# Patient Record
Sex: FEMALE | Race: WHITE | HISPANIC OR LATINO | URBAN - METROPOLITAN AREA
[De-identification: names, ages, dates, MRNs, and addresses within clinical notes are randomized per-mention and may not be internally consistent; named-entity substitution may affect disease eponyms.]

---

## 2018-03-27 ENCOUNTER — OUTPATIENT (OUTPATIENT)
Dept: OUTPATIENT SERVICES | Facility: HOSPITAL | Age: 53
LOS: 1 days | Discharge: HOME | End: 2018-03-27

## 2018-03-27 DIAGNOSIS — Z12.31 ENCOUNTER FOR SCREENING MAMMOGRAM FOR MALIGNANT NEOPLASM OF BREAST: ICD-10-CM

## 2018-03-28 DIAGNOSIS — Z78.0 ASYMPTOMATIC MENOPAUSAL STATE: ICD-10-CM

## 2018-03-28 DIAGNOSIS — M89.9 DISORDER OF BONE, UNSPECIFIED: ICD-10-CM

## 2018-03-28 DIAGNOSIS — Z13.820 ENCOUNTER FOR SCREENING FOR OSTEOPOROSIS: ICD-10-CM

## 2019-12-27 ENCOUNTER — OUTPATIENT (OUTPATIENT)
Dept: OUTPATIENT SERVICES | Facility: HOSPITAL | Age: 54
LOS: 1 days | Discharge: HOME | End: 2019-12-27
Payer: COMMERCIAL

## 2019-12-27 DIAGNOSIS — Z12.31 ENCOUNTER FOR SCREENING MAMMOGRAM FOR MALIGNANT NEOPLASM OF BREAST: ICD-10-CM

## 2019-12-27 PROCEDURE — 77067 SCR MAMMO BI INCL CAD: CPT | Mod: 26

## 2019-12-27 PROCEDURE — 77063 BREAST TOMOSYNTHESIS BI: CPT | Mod: 26

## 2021-05-07 ENCOUNTER — OUTPATIENT (OUTPATIENT)
Dept: OUTPATIENT SERVICES | Facility: HOSPITAL | Age: 56
LOS: 1 days | Discharge: HOME | End: 2021-05-07
Payer: COMMERCIAL

## 2021-05-07 DIAGNOSIS — Z12.31 ENCOUNTER FOR SCREENING MAMMOGRAM FOR MALIGNANT NEOPLASM OF BREAST: ICD-10-CM

## 2021-05-07 DIAGNOSIS — R92.2 INCONCLUSIVE MAMMOGRAM: ICD-10-CM

## 2021-05-07 PROCEDURE — 76641 ULTRASOUND BREAST COMPLETE: CPT | Mod: 26,50

## 2021-05-07 PROCEDURE — 77067 SCR MAMMO BI INCL CAD: CPT | Mod: 26

## 2021-05-07 PROCEDURE — 77063 BREAST TOMOSYNTHESIS BI: CPT | Mod: 26

## 2021-07-08 VITALS — WEIGHT: 169 LBS | DIASTOLIC BLOOD PRESSURE: 84 MMHG | SYSTOLIC BLOOD PRESSURE: 136 MMHG

## 2022-08-05 PROBLEM — Z00.00 ENCOUNTER FOR PREVENTIVE HEALTH EXAMINATION: Status: ACTIVE | Noted: 2022-08-05

## 2022-08-18 ENCOUNTER — RESULT REVIEW (OUTPATIENT)
Age: 57
End: 2022-08-18

## 2022-08-18 ENCOUNTER — OUTPATIENT (OUTPATIENT)
Dept: OUTPATIENT SERVICES | Facility: HOSPITAL | Age: 57
LOS: 1 days | Discharge: HOME | End: 2022-08-18

## 2022-08-18 ENCOUNTER — APPOINTMENT (OUTPATIENT)
Dept: OBGYN | Facility: CLINIC | Age: 57
End: 2022-08-18

## 2022-08-18 DIAGNOSIS — Z12.31 ENCOUNTER FOR SCREENING MAMMOGRAM FOR MALIGNANT NEOPLASM OF BREAST: ICD-10-CM

## 2022-08-18 PROCEDURE — 77063 BREAST TOMOSYNTHESIS BI: CPT | Mod: 26

## 2022-08-18 PROCEDURE — 77067 SCR MAMMO BI INCL CAD: CPT | Mod: 26

## 2022-09-09 DIAGNOSIS — Z92.89 PERSONAL HISTORY OF OTHER MEDICAL TREATMENT: ICD-10-CM

## 2022-09-09 DIAGNOSIS — Z78.9 OTHER SPECIFIED HEALTH STATUS: ICD-10-CM

## 2022-09-09 DIAGNOSIS — Z82.49 FAMILY HISTORY OF ISCHEMIC HEART DISEASE AND OTHER DISEASES OF THE CIRCULATORY SYSTEM: ICD-10-CM

## 2022-09-09 DIAGNOSIS — Z87.19 PERSONAL HISTORY OF OTHER DISEASES OF THE DIGESTIVE SYSTEM: ICD-10-CM

## 2022-09-09 DIAGNOSIS — Z78.0 ASYMPTOMATIC MENOPAUSAL STATE: ICD-10-CM

## 2022-09-09 DIAGNOSIS — Z87.09 PERSONAL HISTORY OF OTHER DISEASES OF THE RESPIRATORY SYSTEM: ICD-10-CM

## 2022-09-15 ENCOUNTER — APPOINTMENT (OUTPATIENT)
Dept: OBGYN | Facility: CLINIC | Age: 57
End: 2022-09-15

## 2022-09-15 VITALS
SYSTOLIC BLOOD PRESSURE: 140 MMHG | WEIGHT: 160 LBS | BODY MASS INDEX: 29.44 KG/M2 | HEART RATE: 86 BPM | HEIGHT: 62 IN | DIASTOLIC BLOOD PRESSURE: 70 MMHG

## 2022-09-15 DIAGNOSIS — Z12.31 ENCOUNTER FOR SCREENING MAMMOGRAM FOR MALIGNANT NEOPLASM OF BREAST: ICD-10-CM

## 2022-09-15 PROCEDURE — 81003 URINALYSIS AUTO W/O SCOPE: CPT | Mod: QW

## 2022-09-15 PROCEDURE — 99396 PREV VISIT EST AGE 40-64: CPT

## 2022-09-15 NOTE — PHYSICAL EXAM
[Examination Of The Breasts] : a normal appearance [No Masses] : no breast masses were palpable [Labia Majora] : normal [Labia Minora] : normal [Normal] : normal [Absent] : absent [Uterine Adnexae] : normal [FreeTextEntry4] : vaginal cuff well supported

## 2022-09-20 LAB
BILIRUB UR QL STRIP: NORMAL
CLARITY UR: CLEAR
COLLECTION METHOD: NORMAL
GLUCOSE UR-MCNC: NORMAL
HCG UR QL: 0.2 EU/DL
HGB UR QL STRIP.AUTO: NORMAL
KETONES UR-MCNC: NORMAL
LEUKOCYTE ESTERASE UR QL STRIP: NORMAL
NITRITE UR QL STRIP: NORMAL
PH UR STRIP: 6
PROT UR STRIP-MCNC: NORMAL
SP GR UR STRIP: 1.03

## 2023-01-17 ENCOUNTER — APPOINTMENT (OUTPATIENT)
Dept: ORTHOPEDIC SURGERY | Facility: CLINIC | Age: 58
End: 2023-01-17
Payer: COMMERCIAL

## 2023-01-17 VITALS — BODY MASS INDEX: 26.68 KG/M2 | HEIGHT: 62 IN | WEIGHT: 145 LBS

## 2023-01-17 DIAGNOSIS — R22.31 LOCALIZED SWELLING, MASS AND LUMP, RIGHT UPPER LIMB: ICD-10-CM

## 2023-01-17 PROCEDURE — 99213 OFFICE O/P EST LOW 20 MIN: CPT

## 2023-01-17 PROCEDURE — 73130 X-RAY EXAM OF HAND: CPT | Mod: RT

## 2023-01-17 PROCEDURE — 99203 OFFICE O/P NEW LOW 30 MIN: CPT

## 2023-01-17 NOTE — HISTORY OF PRESENT ILLNESS
[de-identified] : 58-year-old female is here today for evaluation of her right hand.  Patient states she started having pain and swelling in her right hand over the weekend.  He does not recall any specific injury or trauma.  She states she was playing with her 2-year-old granddaughter but other than that does not recall any cause or injury.  She has not been taking anything for the pain.  She states she has pain when opening and closing her fist and feels stiff.  She denies any numbness or tingling in the hand or fingers.

## 2023-01-17 NOTE — DISCUSSION/SUMMARY
[de-identified] : At this time I placed her in a cock-up wrist brace for comfort.  She cannot take oral anti-inflammatories so I sent a prescription for Voltaren to the pharmacy.  I would like to get blood work to rule out any inflammatory causes.  She states she just had routine blood work done by her primary doctor last week but did not get the results back yet.  She will call me after the blood work to go over the results.  We will schedule her follow-up with our hand department in case her symptoms do not improve. Patient will call me if any other problems or concerns.  Patient verbalized understanding and agreed with the plan, all questions were answered in the office today.\par

## 2023-01-17 NOTE — IMAGING
[de-identified] : On examination of her right hand she does have some swelling over the dorsal aspect of the hand near the MCP joint.  There is some slight ecchymosis and some ecchymosis in the palm.  No erythema or warmth.  She has full range of motion of the hand and fingers but has some mild pain with range of motion.  No point tenderness to palpation over the fingers or the wrist.  Sensation is intact throughout, 2+ radial pulse.\par \par X-rays taken in the office today of the right hand show no acute fractures, dislocations, or other bony abnormalities.

## 2023-02-06 ENCOUNTER — APPOINTMENT (OUTPATIENT)
Dept: NEUROLOGY | Facility: CLINIC | Age: 58
End: 2023-02-06

## 2023-02-06 ENCOUNTER — APPOINTMENT (OUTPATIENT)
Dept: ORTHOPEDIC SURGERY | Facility: CLINIC | Age: 58
End: 2023-02-06
Payer: COMMERCIAL

## 2023-02-06 VITALS — WEIGHT: 145 LBS | HEIGHT: 62 IN | BODY MASS INDEX: 26.68 KG/M2

## 2023-02-06 DIAGNOSIS — M79.641 PAIN IN RIGHT HAND: ICD-10-CM

## 2023-02-06 PROCEDURE — 99214 OFFICE O/P EST MOD 30 MIN: CPT

## 2023-02-06 NOTE — DATA REVIEWED
[FreeTextEntry1] : Radiographs from previous visit reviewed showing no arthritis fracture or dislocation.

## 2023-02-06 NOTE — ASSESSMENT
[FreeTextEntry1] : Patient's pain discomfort the right hand try topical anti-inflammatory she is then going to see how much the pain goes away and does not go away she will consider MRI we will hold off on the MRI for now.  But if she is having continued pain discomfort at 6 weeks from now she should have the MRI performed.\par \par I reviewed her blood work some of it ordered by us and some of it ordered by her medical physician inflammatory work-up was normal

## 2023-02-06 NOTE — REASON FOR VISIT
[FreeTextEntry1] : Advised to add CoQ10 200 mg/d for migraine prophylaxis. Keep headache diary.\par \par RTO 3 months. [FreeTextEntry2] : Rt. hand.

## 2023-02-06 NOTE — PHYSICAL EXAM
[de-identified] : Patient has improved range of motion of the right hand slight swelling present there is normal capillary refill there is more swelling at the MP joint of the index finger and middle finger no instability to stress testing.  There is pain with flexion maximum of the fingers.

## 2023-02-06 NOTE — HISTORY OF PRESENT ILLNESS
[de-identified] : 58-year-old female with no injury and no definite overuse other than being around her grandchildren and reported pain discomfort developing in her right hand she comes in today for evaluation she is a bit better than she was before but still has significant pain discomfort in the right hand.  She cannot take anti-inflammatories because of GERD she did not utilize any topical anti-inflammatory ointments

## 2023-02-26 ENCOUNTER — TRANSCRIPTION ENCOUNTER (OUTPATIENT)
Age: 58
End: 2023-02-26

## 2023-04-19 ENCOUNTER — NON-APPOINTMENT (OUTPATIENT)
Age: 58
End: 2023-04-19

## 2023-04-19 ENCOUNTER — APPOINTMENT (OUTPATIENT)
Dept: NEUROLOGY | Facility: CLINIC | Age: 58
End: 2023-04-19
Payer: COMMERCIAL

## 2023-04-19 VITALS
HEIGHT: 62 IN | BODY MASS INDEX: 22.45 KG/M2 | DIASTOLIC BLOOD PRESSURE: 74 MMHG | WEIGHT: 122 LBS | OXYGEN SATURATION: 100 % | HEART RATE: 92 BPM | SYSTOLIC BLOOD PRESSURE: 135 MMHG | TEMPERATURE: 96.3 F

## 2023-04-19 PROCEDURE — 99204 OFFICE O/P NEW MOD 45 MIN: CPT

## 2023-04-19 NOTE — REASON FOR VISIT
[Initial Evaluation] : an initial evaluation Clindamycin Counseling: I counseled the patient regarding use of clindamycin as an antibiotic for prophylactic and/or therapeutic purposes. Clindamycin is active against numerous classes of bacteria, including skin bacteria. Side effects may include nausea, diarrhea, gastrointestinal upset, rash, hives, yeast infections, and in rare cases, colitis.

## 2023-04-21 NOTE — DISCUSSION/SUMMARY
[Inpatient video EEG study ordered with length of stay anticipated in days: _____] : Inpatient video EEG study ordered with length of stay anticipated in days: [unfilled] [Evaluation for interictal epileptiform activity, subclinical seizures, and risk stratification (typically 2-3 days)] : Evaluation for interictal epileptiform activity, subclinical seizures, and risk stratification (typically 2-3 days) [FreeTextEntry1] : Analia is a 58 year old right handed female with PMH of Anxiety and Panic attacks referred by PMD for seizure evaluation. Described events as highly suggestive of seizure. WIll start with REEG, if normal will need VEEG. Blood work. MRI of the brain. Follow up after work up is complete.\par \par Case discussed with Dr. Daly\par \par Ewelina Giraldo, CATIA, ACNP-BC

## 2023-04-21 NOTE — HISTORY OF PRESENT ILLNESS
[FreeTextEntry1] : Analia is a 58 year old right handed female with PMH of Anxiety and Panic attacks referred by PMD for seizure evaluation. \par Eight years ago pt started having panic attacks/ anxiety and has been treated by psych with Sertaline and Clonazepam with no significant improvement in symptoms. \par Most recently over the last year had 2 episodes of spacing out which was picked up by others. She might be having other episodes that she is not aware of. \par Often wakes up at night occasionally due to anxiety in the middle of the night.\par Denies tongue biting, urinary incontinence or confusion. She is not aware of the episodes and unless somebody picks it up she doesn’t know when she has them.  \par Additionally, pt is undergoing GI evaluation due to unintentional weight loss about 50 lbs since January.\par \par Risk Factors:\par Born full term without complications\par Denies head trauma or CNS infections\par + family history of epilepsy uncle on mothers side\par \par Social:\par Denies smoking, recreational drug use of alcohol intake\par Works part time as a principle consultant \par \par No recent neuroimaging\par

## 2023-04-21 NOTE — PHYSICAL EXAM
[General Appearance - Alert] : alert [General Appearance - In No Acute Distress] : in no acute distress [Oriented To Time, Place, And Person] : oriented to person, place, and time [Person] : oriented to person [Place] : oriented to place [Time] : oriented to time [Cranial Nerves Optic (II)] : visual acuity intact bilaterally,  visual fields full to confrontation, pupils equal round and reactive to light [Cranial Nerves Oculomotor (III)] : extraocular motion intact [Cranial Nerves Trigeminal (V)] : facial sensation intact symmetrically [Cranial Nerves Facial (VII)] : face symmetrical [Cranial Nerves Vestibulocochlear (VIII)] : hearing was intact bilaterally [Cranial Nerves Accessory (XI - Cranial And Spinal)] : head turning and shoulder shrug symmetric [Cranial Nerves Glossopharyngeal (IX)] : tongue and palate midline [Cranial Nerves Hypoglossal (XII)] : there was no tongue deviation with protrusion [Involuntary Movements] : no involuntary movements were seen [Motor Handedness Right-Handed] : the patient is right hand dominant [Abnormal Walk] : normal gait [Balance] : balance was intact [2+] : Ankle jerk left 2+ [Paresis Pronator Drift Right-Sided] : no pronator drift on the right [Paresis Pronator Drift Left-Sided] : no pronator drift on the left [Motor Strength Upper Extremities Bilaterally] : strength was normal in both upper extremities [Motor Strength Lower Extremities Bilaterally] : strength was normal in both lower extremities [Romberg's Sign] : Romberg's sign was negtive [Limited Balance] : balance was intact [Past-pointing] : there was no past-pointing [Tremor] : no tremor present [Coordination - Dysmetria Impaired Finger-to-Nose Bilateral] : not present

## 2023-05-05 ENCOUNTER — APPOINTMENT (OUTPATIENT)
Dept: NEUROLOGY | Facility: CLINIC | Age: 58
End: 2023-05-05
Payer: COMMERCIAL

## 2023-05-05 PROCEDURE — 95816 EEG AWAKE AND DROWSY: CPT

## 2023-07-10 ENCOUNTER — INPATIENT (INPATIENT)
Facility: HOSPITAL | Age: 58
LOS: 2 days | Discharge: HOME | DRG: 101 | End: 2023-07-13
Attending: STUDENT IN AN ORGANIZED HEALTH CARE EDUCATION/TRAINING PROGRAM | Admitting: PSYCHIATRY & NEUROLOGY
Payer: COMMERCIAL

## 2023-07-10 VITALS
TEMPERATURE: 96 F | SYSTOLIC BLOOD PRESSURE: 118 MMHG | WEIGHT: 125.44 LBS | HEIGHT: 62 IN | RESPIRATION RATE: 16 BRPM | OXYGEN SATURATION: 99 % | DIASTOLIC BLOOD PRESSURE: 76 MMHG | HEART RATE: 71 BPM

## 2023-07-10 DIAGNOSIS — K22.70 BARRETT'S ESOPHAGUS WITHOUT DYSPLASIA: ICD-10-CM

## 2023-07-10 DIAGNOSIS — E53.8 DEFICIENCY OF OTHER SPECIFIED B GROUP VITAMINS: ICD-10-CM

## 2023-07-10 DIAGNOSIS — J45.40 MODERATE PERSISTENT ASTHMA, UNCOMPLICATED: ICD-10-CM

## 2023-07-10 DIAGNOSIS — R41.3 OTHER AMNESIA: ICD-10-CM

## 2023-07-10 DIAGNOSIS — F41.9 ANXIETY DISORDER, UNSPECIFIED: ICD-10-CM

## 2023-07-10 DIAGNOSIS — Z98.890 OTHER SPECIFIED POSTPROCEDURAL STATES: Chronic | ICD-10-CM

## 2023-07-10 DIAGNOSIS — G40.909 EPILEPSY, UNSPECIFIED, NOT INTRACTABLE, WITHOUT STATUS EPILEPTICUS: ICD-10-CM

## 2023-07-10 LAB
ANION GAP SERPL CALC-SCNC: 9 MMOL/L — SIGNIFICANT CHANGE UP (ref 7–14)
BUN SERPL-MCNC: 9 MG/DL — LOW (ref 10–20)
CALCIUM SERPL-MCNC: 9.2 MG/DL — SIGNIFICANT CHANGE UP (ref 8.4–10.5)
CHLORIDE SERPL-SCNC: 106 MMOL/L — SIGNIFICANT CHANGE UP (ref 98–110)
CO2 SERPL-SCNC: 25 MMOL/L — SIGNIFICANT CHANGE UP (ref 17–32)
CREAT SERPL-MCNC: 0.6 MG/DL — LOW (ref 0.7–1.5)
EGFR: 104 ML/MIN/1.73M2 — SIGNIFICANT CHANGE UP
GLUCOSE SERPL-MCNC: 104 MG/DL — HIGH (ref 70–99)
HCT VFR BLD CALC: 37 % — SIGNIFICANT CHANGE UP (ref 37–47)
HGB BLD-MCNC: 12 G/DL — SIGNIFICANT CHANGE UP (ref 12–16)
MAGNESIUM SERPL-MCNC: 2.1 MG/DL — SIGNIFICANT CHANGE UP (ref 1.8–2.4)
MCHC RBC-ENTMCNC: 28.9 PG — SIGNIFICANT CHANGE UP (ref 27–31)
MCHC RBC-ENTMCNC: 32.4 G/DL — SIGNIFICANT CHANGE UP (ref 32–37)
MCV RBC AUTO: 89.2 FL — SIGNIFICANT CHANGE UP (ref 81–99)
NRBC # BLD: 0 /100 WBCS — SIGNIFICANT CHANGE UP (ref 0–0)
PLATELET # BLD AUTO: 201 K/UL — SIGNIFICANT CHANGE UP (ref 130–400)
PMV BLD: 11 FL — HIGH (ref 7.4–10.4)
POTASSIUM SERPL-MCNC: 4.4 MMOL/L — SIGNIFICANT CHANGE UP (ref 3.5–5)
POTASSIUM SERPL-SCNC: 4.4 MMOL/L — SIGNIFICANT CHANGE UP (ref 3.5–5)
RBC # BLD: 4.15 M/UL — LOW (ref 4.2–5.4)
RBC # FLD: 13.3 % — SIGNIFICANT CHANGE UP (ref 11.5–14.5)
SODIUM SERPL-SCNC: 140 MMOL/L — SIGNIFICANT CHANGE UP (ref 135–146)
WBC # BLD: 5.38 K/UL — SIGNIFICANT CHANGE UP (ref 4.8–10.8)
WBC # FLD AUTO: 5.38 K/UL — SIGNIFICANT CHANGE UP (ref 4.8–10.8)

## 2023-07-10 PROCEDURE — 83735 ASSAY OF MAGNESIUM: CPT

## 2023-07-10 PROCEDURE — 86255 FLUORESCENT ANTIBODY SCREEN: CPT

## 2023-07-10 PROCEDURE — 82746 ASSAY OF FOLIC ACID SERUM: CPT

## 2023-07-10 PROCEDURE — 84443 ASSAY THYROID STIM HORMONE: CPT

## 2023-07-10 PROCEDURE — 74177 CT ABD & PELVIS W/CONTRAST: CPT

## 2023-07-10 PROCEDURE — 86596 VOLTAGE-GTD CA CHNL ANTB EA: CPT

## 2023-07-10 PROCEDURE — 82607 VITAMIN B-12: CPT

## 2023-07-10 PROCEDURE — 95700 EEG CONT REC W/VID EEG TECH: CPT

## 2023-07-10 PROCEDURE — 86803 HEPATITIS C AB TEST: CPT

## 2023-07-10 PROCEDURE — 85027 COMPLETE CBC AUTOMATED: CPT

## 2023-07-10 PROCEDURE — G0378: CPT

## 2023-07-10 PROCEDURE — 99221 1ST HOSP IP/OBS SF/LOW 40: CPT

## 2023-07-10 PROCEDURE — 80048 BASIC METABOLIC PNL TOTAL CA: CPT

## 2023-07-10 PROCEDURE — 95716 VEEG EA 12-26HR CONT MNTR: CPT

## 2023-07-10 PROCEDURE — 84439 ASSAY OF FREE THYROXINE: CPT

## 2023-07-10 PROCEDURE — 71260 CT THORAX DX C+: CPT

## 2023-07-10 PROCEDURE — 83519 RIA NONANTIBODY: CPT

## 2023-07-10 PROCEDURE — 99222 1ST HOSP IP/OBS MODERATE 55: CPT

## 2023-07-10 PROCEDURE — 36415 COLL VENOUS BLD VENIPUNCTURE: CPT

## 2023-07-10 PROCEDURE — 76830 TRANSVAGINAL US NON-OB: CPT

## 2023-07-10 RX ORDER — FLUTICASONE PROPIONATE AND SALMETEROL 50; 250 UG/1; UG/1
0 POWDER ORAL; RESPIRATORY (INHALATION)
Qty: 0 | Refills: 0 | DISCHARGE

## 2023-07-10 RX ORDER — ONDANSETRON 8 MG/1
4 TABLET, FILM COATED ORAL EVERY 8 HOURS
Refills: 0 | Status: DISCONTINUED | OUTPATIENT
Start: 2023-07-10 | End: 2023-07-13

## 2023-07-10 RX ORDER — PANTOPRAZOLE SODIUM 20 MG/1
40 TABLET, DELAYED RELEASE ORAL
Refills: 0 | Status: DISCONTINUED | OUTPATIENT
Start: 2023-07-10 | End: 2023-07-13

## 2023-07-10 RX ORDER — BUDESONIDE AND FORMOTEROL FUMARATE DIHYDRATE 160; 4.5 UG/1; UG/1
2 AEROSOL RESPIRATORY (INHALATION)
Refills: 0 | Status: DISCONTINUED | OUTPATIENT
Start: 2023-07-10 | End: 2023-07-13

## 2023-07-10 RX ORDER — ACETAMINOPHEN 500 MG
650 TABLET ORAL EVERY 6 HOURS
Refills: 0 | Status: DISCONTINUED | OUTPATIENT
Start: 2023-07-10 | End: 2023-07-13

## 2023-07-10 RX ORDER — OMEPRAZOLE 10 MG/1
0 CAPSULE, DELAYED RELEASE ORAL
Qty: 0 | Refills: 0 | DISCHARGE

## 2023-07-10 RX ORDER — PREGABALIN 225 MG/1
1000 CAPSULE ORAL
Refills: 0 | Status: DISCONTINUED | OUTPATIENT
Start: 2023-07-11 | End: 2023-07-13

## 2023-07-10 RX ORDER — CLONAZEPAM 1 MG
1 TABLET ORAL EVERY 12 HOURS
Refills: 0 | Status: DISCONTINUED | OUTPATIENT
Start: 2023-07-10 | End: 2023-07-11

## 2023-07-10 RX ORDER — ALBUTEROL 90 UG/1
2 AEROSOL, METERED ORAL EVERY 6 HOURS
Refills: 0 | Status: DISCONTINUED | OUTPATIENT
Start: 2023-07-10 | End: 2023-07-13

## 2023-07-10 RX ORDER — LANOLIN ALCOHOL/MO/W.PET/CERES
3 CREAM (GRAM) TOPICAL AT BEDTIME
Refills: 0 | Status: DISCONTINUED | OUTPATIENT
Start: 2023-07-10 | End: 2023-07-10

## 2023-07-10 RX ADMIN — BUDESONIDE AND FORMOTEROL FUMARATE DIHYDRATE 2 PUFF(S): 160; 4.5 AEROSOL RESPIRATORY (INHALATION) at 21:09

## 2023-07-10 RX ADMIN — Medication 650 MILLIGRAM(S): at 19:09

## 2023-07-10 RX ADMIN — Medication 650 MILLIGRAM(S): at 20:19

## 2023-07-10 RX ADMIN — Medication 1 MILLIGRAM(S): at 17:45

## 2023-07-10 NOTE — H&P ADULT - NSHPSOCIALHISTORY_GEN_ALL_CORE
pt denies h/o smoking.    pt reports occasional alcohol use  pt denies h/o illicit drug use pt denies h/o smoking.  pt reports occasional alcohol use  pt denies h/o illicit drug use

## 2023-07-10 NOTE — CONSULT NOTE ADULT - NS ATTEND AMEND GEN_ALL_CORE FT
Agree with history and physical exam.  Patient's history is suggestive of seizure disorder, perhaps temporal.  Will start monitoring for better characterization.  No seizure medications.  Continue Klonopin.  Seizure precautions.

## 2023-07-10 NOTE — CONSULT NOTE ADULT - SUBJECTIVE AND OBJECTIVE BOX
Neurology/Epilepsy Consult:    JAZMYNE EVERETT 58y Female  MRN-[id number]    Patient is a 58y old  Female who presents with a chief complaint of Veeg (10 Jul 2023 10:29)        HPI:  57 y/o F PMHx seizure ds, Tipton's esophagus, asthma  admitted for Veeg. Pt reports to be asymptomatic and denies any acute complaints. Pt denies fever, chills, chest pain, SOB, diaphoresis, n/v  (10 Jul 2023 10:29)        PAST MEDICAL & SURGICAL HISTORY:  Tipton esophagus  Asthma  Osteopenia  Anxiety  H/O right knee surgery  H/o hysterectomy  H/o left hand surgery          FAMILY HISTORY:  type 2 diabetes (Mother)  HTN (Mother)  No seizures          Social History:  Retired from [employer]  Lives alone  Denies recreational drug use  ETOH - socially          Risk factors:  Born full-term, , no complications  Normal growth and development  No febrile seizures/CNS infections  No head trauma with loss of consciousness        Allergy:  codeine (Hives)        Home Medications:  ALBUTEROL HFA INH (200 PUFFS) 6.7GM: INHALE 2 PUFFS BY MOUTH EVERY 4 HOURS PRN  CLONAZEPAM 1MG TABLETS: TAKE 1 TABLET BY MOUTH TWICE DAILY   OMEPRAZOLE  40 MG in the morning  WIXELA INHUB  100-50 MCG/ACT AEPB daily  Vitamin B12 1000mcg daily in the morning  Vitamin D2 50,000 IU every Wednesday        MEDICATIONS  (STANDING):  clonazePAM  Tablet 1 milliGRAM(s) Oral every 12 hours  pantoprazole    Tablet 40 milliGRAM(s) Oral before breakfast    MEDICATIONS  (PRN):  acetaminophen     Tablet .. 650 milliGRAM(s) Oral every 6 hours PRN Temp greater or equal to 38C (100.4F), Mild Pain (1 - 3)  albuterol    90 MICROgram(s) HFA Inhaler 2 Puff(s) Inhalation every 6 hours PRN Shortness of Breath and/or Wheezing  LORazepam   Injectable 2 milliGRAM(s) IV Push every 6 hours PRN generalized tonic-clonic seizure lasting longer than 2 minutes, or two consecutive seizures without return to baseline in-between  ondansetron Injectable 4 milliGRAM(s) IV Push every 8 hours PRN Nausea and/or Vomiting            T(F): 96.2 (07-10-23 @ 11:01), Max: 96.2 (07-10-23 @ 11:01)  HR: 71 (07-10-23 @ 11:01) (71 - 71)  BP: 118/76 (07-10-23 @ 11:01) (118/76 - 118/76)  RR: 16 (07-10-23 @ 11:01) (16 - 16)  SpO2: 99% (07-10-23 @ 11:01) (99% - 99%)        Neurologic Examination:  General:  Appearance is consistent with chronologic age.  No abnormal facies.   General: The patient is oriented to person, place, time and date.  Recent and remote memory intact.  Follows 4-step directions. Fund of knowledge is intact and normal.  Language with normal repetition, comprehension and naming.  Nondysarthric.    Cranial nerves: EOMI w/o nystagmus, skew or reported double vision.  PERRL.  No ptosis/weakness of eyelid closure.  Facial sensation is normal with normal bite.  No facial asymmetry.  Hearing grossly intact b/l.  Palate elevates midline.  Tongue midline.  Motor examination:   Normal tone, bulk and range of motion.  No tenderness, twitching, tremors or involuntary movements.  Formal Muscle Strength Testin/5 UE; 5/5 LE.  No observable drift.  Reflexes:   2+ b/l   Sensory examination:   Intact to light touch and pinprick, pain, temperature.  Cerebellum:   FTN/HKS intact with normal SHAMIKA in all limbs.  No dysmetria or dysdiadokinesia.  Gait narrow based and normal.        Labs:                                 Neuroimaging:  CT Head:   CT Angiography/Perfusion:   MRI Brain:   MRA Head/Neck:     Carotid US:         EEG:       Assessment:  This is a 58y Female with h/o         Discussed with Dr. Daly        Plan:   - VEEG monitoring for better characterization and assessment  - Seizure precautions  - Ativan 2mg IV PRN for generalized tonic-clonic seizure lasting longer than 2 minutes, or two consecutive seizures without return to baseline in-between (ordered)  - CBC, CMP, Mg, CK, AED levels trough (ordered)  - Keep Mg above 2    Plan discussed with patient in details, all questions answered.    Discussed with nursing team, hospitalist, and PA. Neurology/Epilepsy Consult:    JAZMYNE EVERETT 58y Female  MRN-[id number]    Patient is a 58y old right-handed Female who presents for elective VEEG.        HPI: History obtained from patient. Outpatient records and I-STOP reviewed.          PAST MEDICAL & SURGICAL HISTORY:  Tipton esophagus  Asthma  Osteopenia  Anxiety  H/O right knee surgery  H/o hysterectomy  H/o left hand surgery          FAMILY HISTORY:  type 2 diabetes (Mother)  HTN (Mother)  No seizures          Social History:  Retired from [employer]  Lives alone  Denies recreational drug use  ETOH - socially          Risk factors:  Born full-term, , no complications  Normal growth and development  No febrile seizures/CNS infections  No head trauma with loss of consciousness        Allergy:  codeine (Hives)        Home Medications:  ALBUTEROL HFA INH (200 PUFFS) 6.7GM: INHALE 2 PUFFS BY MOUTH EVERY 4 HOURS PRN  CLONAZEPAM 1MG TABLETS: TAKE 1 TABLET BY MOUTH TWICE DAILY   OMEPRAZOLE  40 MG in the morning  WIXELA INHUB  100-50 MCG/ACT AEPB daily  Vitamin B12 1000mcg daily in the morning  Vitamin D2 50,000 IU every Wednesday        MEDICATIONS  (STANDING):  clonazePAM  Tablet 1 milliGRAM(s) Oral every 12 hours  pantoprazole    Tablet 40 milliGRAM(s) Oral before breakfast    MEDICATIONS  (PRN):  acetaminophen     Tablet .. 650 milliGRAM(s) Oral every 6 hours PRN Temp greater or equal to 38C (100.4F), Mild Pain (1 - 3)  albuterol    90 MICROgram(s) HFA Inhaler 2 Puff(s) Inhalation every 6 hours PRN Shortness of Breath and/or Wheezing  LORazepam   Injectable 2 milliGRAM(s) IV Push every 6 hours PRN generalized tonic-clonic seizure lasting longer than 2 minutes, or two consecutive seizures without return to baseline in-between  ondansetron Injectable 4 milliGRAM(s) IV Push every 8 hours PRN Nausea and/or Vomiting            T(F): 96.2 (07-10-23 @ 11:01), Max: 96.2 (07-10-23 @ 11:01)  HR: 71 (07-10-23 @ 11:01) (71 - 71)  BP: 118/76 (07-10-23 @ 11:01) (118/76 - 118/76)  RR: 16 (07-10-23 @ 11:01) (16 - 16)  SpO2: 99% (07-10-23 @ 11:01) (99% - 99%)        Neurologic Examination:  General:  Appearance is consistent with chronologic age.  No abnormal facies.   General: The patient is oriented to person, place, time and date.  Recent and remote memory intact.  Follows 4-step directions. Fund of knowledge is intact and normal.  Language with normal repetition, comprehension and naming.  Nondysarthric.    Cranial nerves: EOMI w/o nystagmus, skew or reported double vision.  PERRL.  No ptosis/weakness of eyelid closure.  Facial sensation is normal with normal bite.  No facial asymmetry.  Hearing grossly intact b/l.  Palate elevates midline.  Tongue midline.  Motor examination:   Normal tone, bulk and range of motion.  No tenderness, twitching, tremors or involuntary movements.  Formal Muscle Strength Testin/5 UE; 5/5 LE.  No observable drift.  Reflexes:   2+ b/l   Sensory examination:   Intact to light touch and pinprick, pain, temperature.  Cerebellum:   FTN/HKS intact with normal SHAMIKA in all limbs.  No dysmetria or dysdiadokinesia.  Gait narrow based and normal.            Neuroimaging:  None        REEG 2023 - normal           Assessment:  This is a 58y Female with h/o         Discussed with Dr. Daly        Plan:   - VEEG monitoring for better characterization and risk assessment  - Seizure precautions  - Ativan 2mg IV PRN for generalized tonic-clonic seizure lasting longer than 2 minutes, or two consecutive seizures without return to baseline in-between (ordered)  - CBC, CMP, B12, Folate, TSH  - Keep Mg above 2    Plan discussed with patient in details, all questions answered.    Discussed with nursing team, hospitalist, and PA. Neurology/Epilepsy Consult:    JAZMYNE EVERETT 58y Female  MRN-[id number]    Patient is a 58y old right-handed Female who presents for elective VEEG.        HPI: History obtained from patient. Outpatient records and I-STOP reviewed.  Patient was recently referred to neurology by PMD to be evaluated for seizures. In  patient started having episodes of chest tightness and palpitations every time she was returning home from work through [employer]. She was evaluated by cardiologist with normal work up, then referred to psychiatry and was diagnosed with anxiety disorder. It must be noted that patient was under significant stress at home, and realized that the episodes were happening every time while she was going towards home. She was started on Zoloft that she had difficulty tolerating, then switched to Klonopin that helped. Her Klonopin dose was increased few months ago since she had occasional episodes, though at this point they did not have any particular pattern.  In addition, patient feels her memory is getting worse in the last 6-9 months. She states she cannot remember certain conversations or events, but denies any events that could be described as confusional state. She is able to manage all her finances independently.  Patient reports occasionally waking up at night feeling anxious and having difficulty going back to sleep. Denies any episodes of bladder loss, or tongue biting.  In the last few months her family reported few episodes of "spacing out" lasting few seconds with immediate return to baseline.  Patient is currently being evaluated by PMD and GI for causes of unintentional weight loss (she reports losing 65lbs in the last 7 months). She denies any changes in appetite or bowel/bladder pattern.          PAST MEDICAL & SURGICAL HISTORY:  Tipton's esophagus  Asthma  Osteopenia  Anxiety  H/O right knee surgery  H/o hysterectomy  H/o left hand surgery          FAMILY HISTORY:  type 2 diabetes (Mother)  HTN (Mother)  uncle - seizures          Social History:  Retired from [employer]  Lives alone  Denies recreational drug use  ETOH - socially          Risk factors:  Born full-term, , no complications  Normal growth and development  No febrile seizures/CNS infections  No head trauma with loss of consciousness        Allergy:  codeine (Hives)        Home Medications:  ALBUTEROL HFA INH (200 PUFFS) 6.7GM: INHALE 2 PUFFS BY MOUTH EVERY 4 HOURS PRN  CLONAZEPAM 1MG TABLETS: TAKE 1 TABLET BY MOUTH TWICE DAILY   OMEPRAZOLE  40 MG in the morning  WIXELA INHUB  100-50 MCG/ACT AEPB daily  Vitamin B12 1000mcg daily in the morning  Vitamin D2 50,000 IU every Wednesday        MEDICATIONS  (STANDING):  clonazePAM  Tablet 1 milliGRAM(s) Oral every 12 hours  pantoprazole    Tablet 40 milliGRAM(s) Oral before breakfast    MEDICATIONS  (PRN):  acetaminophen     Tablet .. 650 milliGRAM(s) Oral every 6 hours PRN Temp greater or equal to 38C (100.4F), Mild Pain (1 - 3)  albuterol    90 MICROgram(s) HFA Inhaler 2 Puff(s) Inhalation every 6 hours PRN Shortness of Breath and/or Wheezing  LORazepam   Injectable 2 milliGRAM(s) IV Push every 6 hours PRN generalized tonic-clonic seizure lasting longer than 2 minutes, or two consecutive seizures without return to baseline in-between  ondansetron Injectable 4 milliGRAM(s) IV Push every 8 hours PRN Nausea and/or Vomiting            T(F): 96.2 (07-10-23 @ 11:01), Max: 96.2 (07-10-23 @ 11:01)  HR: 71 (07-10-23 @ 11:01) (71 - 71)  BP: 118/76 (07-10-23 @ 11:01) (118/76 - 118/76)  RR: 16 (07-10-23 @ 11:01) (16 - 16)  SpO2: 99% (07-10-23 @ 11:01) (99% - 99%)        Neurologic Examination:  General:  Appearance is consistent with chronologic age.  No abnormal facies.   General: The patient is oriented to person, place, time and date.  Recent and remote memory intact.  Follows 4-step directions. Fund of knowledge is intact and normal.  Language with normal repetition, comprehension and naming.  Nondysarthric.    Cranial nerves: EOMI w/o nystagmus, skew or reported double vision.  PERRL.  No ptosis/weakness of eyelid closure.  Facial sensation is normal with normal bite.  No facial asymmetry.  Hearing grossly intact b/l.  Palate elevates midline.  Tongue midline.  Motor examination:   Normal tone, bulk and range of motion.  No tenderness, twitching, tremors or involuntary movements.  Formal Muscle Strength Testin/5 UE; 5/5 LE.  No observable drift.  Reflexes:   2+ b/l   Sensory examination:   Intact to light touch and pinprick, pain, temperature.  Cerebellum:   FTN/HKS intact with normal SHAMIKA in all limbs.  No dysmetria or dysdiadokinesia.  Gait narrow based and normal.            Neuroimaging:  None        REEG 2023 - normal           Assessment:  This is a 58y Female with h/o anxiety, osteopenia, Tipton's esophagus, asthma, with worsening memory and staring spells. Patient also reported significant unintentional weight loss since January and is currently undergoing outpatient work up.        Discussed with Dr. Daly        Plan:   - VEEG monitoring for better characterization and risk assessment  - Seizure precautions  - Ativan 2mg IV PRN for generalized tonic-clonic seizure lasting longer than 2 minutes, or two consecutive seizures without return to baseline in-between (ordered)  - CBC, CMP, B12, Folate, TSH  - Keep Mg above 2    Plan discussed with patient in details, all questions answered.    Discussed with nursing team, hospitalist, and PA.

## 2023-07-10 NOTE — H&P ADULT - HISTORY OF PRESENT ILLNESS
59 y/o F PMHx  admitted for Veeg 57 y/o F PMHx seizure ds, Tipton's esophagus, asthma  admitted for Veeg. Pt reports to be asymptomatic and denies any acute complaints. Pt denies fever, chills, chest pain, SOB, diaphoresis, n/v  59 y/o F PMHx  anxiety, Tipton's esophagus, moderate persistent asthma  admitted for Veeg. Patient reports intermittent memory loss without LOC, or tongue biting, or incontinence.  Pt denies fever, chills, chest pain, SOB, diaphoresis, n/v .

## 2023-07-10 NOTE — H&P ADULT - NSICDXPASTMEDICALHX_GEN_ALL_CORE_FT
PAST MEDICAL HISTORY:  Asthma     Tipton esophagus      PAST MEDICAL HISTORY:  Anxiety     Tipton esophagus     Moderate persistent asthma

## 2023-07-10 NOTE — H&P ADULT - NSHPPHYSICALEXAM_GEN_ALL_CORE
T(C): 35.7 (07-10-23 @ 11:01), Max: 35.7 (07-10-23 @ 11:01)  HR: 71 (07-10-23 @ 11:01) (71 - 71)  BP: 118/76 (07-10-23 @ 11:01) (118/76 - 118/76)  RR: 16 (07-10-23 @ 11:01) (16 - 16)  SpO2: 99% (07-10-23 @ 11:01) (99% - 99%)    PHYSICAL EXAM:  GENERAL: laying in bed, appearing comfortable and in NAD  HEENT: Moist mucous membranes  NECK: Supple  CHEST/LUNG: even respirations b/l; no accessory muscle use  ABDOMEN: Soft, Nontender, Nondistended  EXTREMITIES:   No calf TTP b/l  SKIN: warm  Neuro: A&Ox4

## 2023-07-10 NOTE — H&P ADULT - NSICDXFAMILYHX_GEN_ALL_CORE_FT
FAMILY HISTORY:  Mother  Still living? Unknown  FH: HTN (hypertension), Age at diagnosis: Age Unknown  FH: type 2 diabetes, Age at diagnosis: Age Unknown     FAMILY HISTORY:  Father  Still living? Unknown  FH: pancreatic cancer, Age at diagnosis: Age Unknown    Mother  Still living? Unknown  FH: HTN (hypertension), Age at diagnosis: Age Unknown  FH: type 2 diabetes, Age at diagnosis: Age Unknown

## 2023-07-10 NOTE — PATIENT PROFILE ADULT - TOBACCO USE
Never smoker Complex Repair And Double M Plasty Text: The defect edges were debeveled with a #15 scalpel blade.  The primary defect was closed partially with a complex linear closure.  Given the location of the remaining defect, shape of the defect and the proximity to free margins a double M plasty was deemed most appropriate for complete closure of the defect.  Using a sterile surgical marker, an appropriate advancement flap was drawn incorporating the defect and placing the expected incisions within the relaxed skin tension lines where possible.    The area thus outlined was incised deep to adipose tissue with a #15 scalpel blade.  The skin margins were undermined to an appropriate distance in all directions utilizing iris scissors.

## 2023-07-10 NOTE — H&P ADULT - ASSESSMENT
59 y/o F PMHx  admitted for Veeg    # seizure disorder  - admit to EMU  - Video EEG for characterization and treatment plan  - seizure precautions  -- Ativan 2mg IV PRN for generalized tonic-clonic seizure lasting longer than 2 minutes, or two consecutive seizures without return to baseline in-between   - continue AED at home dose, obtain levels  - Mg level, keep > 2.0  - neurology consult 59 y/o F PMHx seizure ds, Tipton's esophagus, asthma  admitted for Veeg. Pt reports to be asymptomatic and denies any acute complaints. Pt denies fever, chills, chest pain, SOB, diaphoresis, n/v       # seizure disorder  - admit to EMU  - Video EEG for characterization and treatment plan  - seizure precautions  -- Ativan 2mg IV PRN for generalized tonic-clonic seizure lasting longer than 2 minutes, or two consecutive seizures without return to baseline in-between   - continue AED at home dose, obtain levels  - Mg level, keep > 2.0; f/u 1pm labs  - neurology consult  -c/w clonazepam for now pending Neuro consult    # Tipton's esophagus  - c/w omperazole       # Asthma  c/w albuterol PRN  - Wixela non-formulary; pt aware and agrees to provide RN with med for non-formulary med processing      Pt d/w Dr. Juárez 59 y/o F PMHx seizure ds, Tipton's esophagus, asthma  admitted for Veeg. Pt reports to be asymptomatic and denies any acute complaints. Pt denies fever, chills, chest pain, SOB, diaphoresis, n/v       Intermittent memory loss rule out seizures or possibly polypharmacy.   - admit to EMU  - Video EEG for characterization and treatment plan  - seizure precautions  -- Ativan 2mg IV PRN for generalized tonic-clonic seizure lasting longer than 2 minutes, or two consecutive seizures without return to baseline in-between   - continue AED at home dose, obtain levels  - Mg level, keep > 2.0; f/u 1pm labs  - neurology consulted     Tipton's esophagus/GERD  - c/w omperazole     Moderate persistent Asthma  - c/w albuterol PRN  - Wixela non-formulary> symbicort   - stable     Anxiety  - clonazepam BID.    - consider SSRI    B12 deficiency   - cont b12     DVT/GI px   - Lovenox/ on a diet.     Full code

## 2023-07-11 LAB
ANION GAP SERPL CALC-SCNC: 10 MMOL/L — SIGNIFICANT CHANGE UP (ref 7–14)
BUN SERPL-MCNC: 9 MG/DL — LOW (ref 10–20)
CALCIUM SERPL-MCNC: 9.7 MG/DL — SIGNIFICANT CHANGE UP (ref 8.4–10.5)
CHLORIDE SERPL-SCNC: 106 MMOL/L — SIGNIFICANT CHANGE UP (ref 98–110)
CO2 SERPL-SCNC: 26 MMOL/L — SIGNIFICANT CHANGE UP (ref 17–32)
CREAT SERPL-MCNC: 0.6 MG/DL — LOW (ref 0.7–1.5)
EGFR: 104 ML/MIN/1.73M2 — SIGNIFICANT CHANGE UP
FOLATE SERPL-MCNC: 10.9 NG/ML — SIGNIFICANT CHANGE UP
GLUCOSE SERPL-MCNC: 99 MG/DL — SIGNIFICANT CHANGE UP (ref 70–99)
HCT VFR BLD CALC: 41.3 % — SIGNIFICANT CHANGE UP (ref 37–47)
HCV AB S/CO SERPL IA: 0.05 COI — SIGNIFICANT CHANGE UP
HCV AB SERPL-IMP: SIGNIFICANT CHANGE UP
HGB BLD-MCNC: 13.3 G/DL — SIGNIFICANT CHANGE UP (ref 12–16)
MAGNESIUM SERPL-MCNC: 2.1 MG/DL — SIGNIFICANT CHANGE UP (ref 1.8–2.4)
MCHC RBC-ENTMCNC: 28.6 PG — SIGNIFICANT CHANGE UP (ref 27–31)
MCHC RBC-ENTMCNC: 32.2 G/DL — SIGNIFICANT CHANGE UP (ref 32–37)
MCV RBC AUTO: 88.8 FL — SIGNIFICANT CHANGE UP (ref 81–99)
NRBC # BLD: 0 /100 WBCS — SIGNIFICANT CHANGE UP (ref 0–0)
PLATELET # BLD AUTO: 209 K/UL — SIGNIFICANT CHANGE UP (ref 130–400)
PMV BLD: 10.7 FL — HIGH (ref 7.4–10.4)
POTASSIUM SERPL-MCNC: 4.5 MMOL/L — SIGNIFICANT CHANGE UP (ref 3.5–5)
POTASSIUM SERPL-SCNC: 4.5 MMOL/L — SIGNIFICANT CHANGE UP (ref 3.5–5)
RBC # BLD: 4.65 M/UL — SIGNIFICANT CHANGE UP (ref 4.2–5.4)
RBC # FLD: 13.3 % — SIGNIFICANT CHANGE UP (ref 11.5–14.5)
SODIUM SERPL-SCNC: 142 MMOL/L — SIGNIFICANT CHANGE UP (ref 135–146)
TSH SERPL-MCNC: 1.16 UIU/ML — SIGNIFICANT CHANGE UP (ref 0.27–4.2)
VIT B12 SERPL-MCNC: 513 PG/ML — SIGNIFICANT CHANGE UP (ref 232–1245)
WBC # BLD: 4.6 K/UL — LOW (ref 4.8–10.8)
WBC # FLD AUTO: 4.6 K/UL — LOW (ref 4.8–10.8)

## 2023-07-11 PROCEDURE — 95720 EEG PHY/QHP EA INCR W/VEEG: CPT

## 2023-07-11 PROCEDURE — 99231 SBSQ HOSP IP/OBS SF/LOW 25: CPT

## 2023-07-11 PROCEDURE — 99232 SBSQ HOSP IP/OBS MODERATE 35: CPT

## 2023-07-11 RX ORDER — CLONAZEPAM 1 MG
0.5 TABLET ORAL
Refills: 0 | Status: DISCONTINUED | OUTPATIENT
Start: 2023-07-11 | End: 2023-07-13

## 2023-07-11 RX ADMIN — Medication 0.5 MILLIGRAM(S): at 18:39

## 2023-07-11 RX ADMIN — Medication 650 MILLIGRAM(S): at 10:15

## 2023-07-11 RX ADMIN — Medication 1 MILLIGRAM(S): at 05:18

## 2023-07-11 RX ADMIN — Medication 650 MILLIGRAM(S): at 21:00

## 2023-07-11 RX ADMIN — Medication 650 MILLIGRAM(S): at 09:42

## 2023-07-11 RX ADMIN — Medication 650 MILLIGRAM(S): at 22:36

## 2023-07-11 RX ADMIN — PREGABALIN 1000 MICROGRAM(S): 225 CAPSULE ORAL at 08:07

## 2023-07-11 RX ADMIN — PANTOPRAZOLE SODIUM 40 MILLIGRAM(S): 20 TABLET, DELAYED RELEASE ORAL at 05:18

## 2023-07-11 RX ADMIN — BUDESONIDE AND FORMOTEROL FUMARATE DIHYDRATE 2 PUFF(S): 160; 4.5 AEROSOL RESPIRATORY (INHALATION) at 08:07

## 2023-07-11 RX ADMIN — BUDESONIDE AND FORMOTEROL FUMARATE DIHYDRATE 2 PUFF(S): 160; 4.5 AEROSOL RESPIRATORY (INHALATION) at 20:39

## 2023-07-11 NOTE — PROGRESS NOTE ADULT - SUBJECTIVE AND OBJECTIVE BOX
Epilepsy Attending Note:     JAZMYNE EVERETT    58y Female  MRN [id number]    Vital Signs Last 24 Hrs  T(C): 36 (2023 04:35), Max: 36 (2023 04:35)  T(F): 96.8 (2023 04:35), Max: 96.8 (2023 04:35)  HR: 55 (2023 04:35) (55 - 71)  BP: 112/71 (2023 04:35) (110/69 - 118/76)  BP(mean): --  RR: 18 (2023 04:35) (16 - 18)  SpO2: 99% (10 Jul 2023 11:01) (99% - 99%)                              13.3   4.60  )-----------( 209      ( 2023 08:56 )             41.3       07-11    142  |  106  |  9<L>  ----------------------------<  99  4.5   |  26  |  0.6<L>    Ca    9.7      2023 08:56  Mg     2.1     07-11        MEDICATIONS  (STANDING):  budesonide 160 MICROgram(s)/formoterol 4.5 MICROgram(s) Inhaler 2 Puff(s) Inhalation two times a day  clonazePAM  Tablet 1 milliGRAM(s) Oral every 12 hours  cyanocobalamin 1000 MICROGram(s) Oral <User Schedule>  pantoprazole    Tablet 40 milliGRAM(s) Oral before breakfast    MEDICATIONS  (PRN):  acetaminophen     Tablet .. 650 milliGRAM(s) Oral every 6 hours PRN Temp greater or equal to 38C (100.4F), Mild Pain (1 - 3)  albuterol    90 MICROgram(s) HFA Inhaler 2 Puff(s) Inhalation every 6 hours PRN Shortness of Breath and/or Wheezing  LORazepam   Injectable 2 milliGRAM(s) IV Push every 6 hours PRN generalized tonic-clonic seizure lasting longer than 2 minutes, or two consecutive seizures without return to baseline in-between  ondansetron Injectable 4 milliGRAM(s) IV Push every 8 hours PRN Nausea and/or Vomiting            VEEG in the last 24 hours:    Background - continuous, symmetrical, well organized, reaching frequencies in the range of 89- Hz, reactive    Focal and generalized slowin. no generalized slowing,   2. borderline left FT focal slowing, mainly during drowsiness    Interictal activity - small to moderate number of left FT sharp waves that appear epileptiform in nature    Events - none    Seizures - none    Impression: Abnormal VEEG as above    Plan -   Will continue monitoring  Seizure precautions  Decrease Klonopin to 0.5mg q12hrs  Discussed with patient, all questions answered.

## 2023-07-11 NOTE — PROGRESS NOTE ADULT - SUBJECTIVE AND OBJECTIVE BOX
JAZMYNE [id number]    Subjective/Interval History   - No overnight events     ROS  - no fever, or chest pain.     PHYSICAL EXAM  Vital Signs Last 24 Hrs  T(C): 35.6 (11 Jul 2023 14:13), Max: 36 (11 Jul 2023 04:35)  T(F): 96.1 (11 Jul 2023 14:13), Max: 96.8 (11 Jul 2023 04:35)  HR: 71 (11 Jul 2023 14:13) (55 - 71)  BP: 124/68 (11 Jul 2023 14:13) (110/69 - 124/68)  BP(mean): --  RR: 20 (11 Jul 2023 14:13) (18 - 20)  SpO2: --      GA : AAOX3, NAD   HEENT: PERRLA, EOMI  NECK: no JVD, no thyromegaly   CVS: S1 S2 no murmur no rubs no gallop  RESP: CTAB no wheeze, no rhonchi no rales  ABD: Soft, NT, ND, tympanic, no rebound or guarding   : No Joseph, No CVA tenderness   EXT; no pedal edema, no cyanosis  MSK: No ML spinal tenderness, normal ROM   NEURO: AAOX3, no new focal deficits     LABS/ IMAGING                        13.3   4.60  )-----------( 209      ( 11 Jul 2023 08:56 )             41.3         07-11    142  |  106  |  9<L>  ----------------------------<  99  4.5   |  26  |  0.6<L>    Ca    9.7      11 Jul 2023 08:56  Mg     2.1     07-11      CAPILLARY BLOOD GLUCOSE        Urinalysis Basic - ( 11 Jul 2023 08:56 )    Color: x / Appearance: x / SG: x / pH: x  Gluc: 99 mg/dL / Ketone: x  / Bili: x / Urobili: x   Blood: x / Protein: x / Nitrite: x   Leuk Esterase: x / RBC: x / WBC x   Sq Epi: x / Non Sq Epi: x / Bacteria: x

## 2023-07-12 LAB
T4 FREE SERPL-MCNC: 1.2 NG/DL — SIGNIFICANT CHANGE UP (ref 0.9–1.8)
TSH SERPL-MCNC: 1.17 UIU/ML — SIGNIFICANT CHANGE UP (ref 0.27–4.2)

## 2023-07-12 PROCEDURE — 99231 SBSQ HOSP IP/OBS SF/LOW 25: CPT

## 2023-07-12 PROCEDURE — 95720 EEG PHY/QHP EA INCR W/VEEG: CPT

## 2023-07-12 PROCEDURE — 99232 SBSQ HOSP IP/OBS MODERATE 35: CPT

## 2023-07-12 RX ORDER — ERGOCALCIFEROL 1.25 MG/1
50000 CAPSULE ORAL ONCE
Refills: 0 | Status: COMPLETED | OUTPATIENT
Start: 2023-07-12 | End: 2023-07-12

## 2023-07-12 RX ADMIN — Medication 0.5 MILLIGRAM(S): at 05:21

## 2023-07-12 RX ADMIN — Medication 650 MILLIGRAM(S): at 18:52

## 2023-07-12 RX ADMIN — BUDESONIDE AND FORMOTEROL FUMARATE DIHYDRATE 2 PUFF(S): 160; 4.5 AEROSOL RESPIRATORY (INHALATION) at 09:38

## 2023-07-12 RX ADMIN — Medication 650 MILLIGRAM(S): at 09:37

## 2023-07-12 RX ADMIN — Medication 0.5 MILLIGRAM(S): at 17:40

## 2023-07-12 RX ADMIN — Medication 650 MILLIGRAM(S): at 10:34

## 2023-07-12 RX ADMIN — ERGOCALCIFEROL 50000 UNIT(S): 1.25 CAPSULE ORAL at 17:39

## 2023-07-12 RX ADMIN — Medication 650 MILLIGRAM(S): at 19:30

## 2023-07-12 RX ADMIN — PREGABALIN 1000 MICROGRAM(S): 225 CAPSULE ORAL at 09:37

## 2023-07-12 RX ADMIN — PANTOPRAZOLE SODIUM 40 MILLIGRAM(S): 20 TABLET, DELAYED RELEASE ORAL at 05:21

## 2023-07-12 NOTE — PROGRESS NOTE ADULT - SUBJECTIVE AND OBJECTIVE BOX
JAZMYNE EVERETT  58y  Female     History of Present Illness: Patient was seen and examined today. Pt denied any complaints.       Vital Signs Last 24 Hrs  T(C): 36.2 (12 Jul 2023 05:25), Max: 36.2 (12 Jul 2023 05:25)  T(F): 97.2 (12 Jul 2023 05:25), Max: 97.2 (12 Jul 2023 05:25)  HR: 65 (12 Jul 2023 05:25) (65 - 73)  BP: 109/52 (12 Jul 2023 05:25) (109/52 - 172/77)  BP(mean): --  RR: 18 (12 Jul 2023 05:25) (18 - 20)  SpO2: --        PHYSICAL EXAM:  GENERAL: NAD, well-groomed, well-developed  HEENT - NC/AT, pupils equal and reactive to light,  ; Moist mucous membranes, Good dentition, No lesions  NECK: Supple, No JVD  CHEST/LUNG: Clear to auscultation bilaterally; No rales, rhonchi, wheezing  HEART: Regular rate and rhythm; No murmurs, rubs, or gallops  ABDOMEN: Soft, Nontender, Nondistended; Bowel sounds present  EXTREMITIES:  2+ Peripheral Pulses, No clubbing, cyanosis, or edema  NEURO:  No Focal deficits, sensory and motor intact        acetaminophen     Tablet .. 650 milliGRAM(s) Oral every 6 hours PRN  albuterol    90 MICROgram(s) HFA Inhaler 2 Puff(s) Inhalation every 6 hours PRN  budesonide 160 MICROgram(s)/formoterol 4.5 MICROgram(s) Inhaler 2 Puff(s) Inhalation two times a day  clonazePAM  Tablet 0.5 milliGRAM(s) Oral two times a day  cyanocobalamin 1000 MICROGram(s) Oral <User Schedule>  LORazepam   Injectable 2 milliGRAM(s) IV Push every 6 hours PRN  ondansetron Injectable 4 milliGRAM(s) IV Push every 8 hours PRN  pantoprazole    Tablet 40 milliGRAM(s) Oral before breakfast

## 2023-07-12 NOTE — PROGRESS NOTE ADULT - SUBJECTIVE AND OBJECTIVE BOX
Epilepsy Attending Note:     JAZMYNE EVERETT    58y Female  MRN [id number]    Vital Signs Last 24 Hrs  T(C): 36.2 (2023 05:25), Max: 36.2 (2023 05:25)  T(F): 97.2 (2023 05:25), Max: 97.2 (2023 05:25)  HR: 65 (2023 05:25) (65 - 73)  BP: 109/52 (2023 05:25) (109/52 - 172/77)  BP(mean): --  RR: 18 (2023 05:25) (18 - 20)  SpO2: --                              13.3   4.60  )-----------( 209      ( 2023 08:56 )             41.3       07-11    142  |  106  |  9<L>  ----------------------------<  99  4.5   |  26  |  0.6<L>    Ca    9.7      2023 08:56  Mg     2.1     07-11        MEDICATIONS  (STANDING):  budesonide 160 MICROgram(s)/formoterol 4.5 MICROgram(s) Inhaler 2 Puff(s) Inhalation two times a day  clonazePAM  Tablet 0.5 milliGRAM(s) Oral two times a day  cyanocobalamin 1000 MICROGram(s) Oral <User Schedule>  pantoprazole    Tablet 40 milliGRAM(s) Oral before breakfast    MEDICATIONS  (PRN):  acetaminophen     Tablet .. 650 milliGRAM(s) Oral every 6 hours PRN Temp greater or equal to 38C (100.4F), Mild Pain (1 - 3)  albuterol    90 MICROgram(s) HFA Inhaler 2 Puff(s) Inhalation every 6 hours PRN Shortness of Breath and/or Wheezing  LORazepam   Injectable 2 milliGRAM(s) IV Push every 6 hours PRN generalized tonic-clonic seizure lasting longer than 2 minutes, or two consecutive seizures without return to baseline in-between  ondansetron Injectable 4 milliGRAM(s) IV Push every 8 hours PRN Nausea and/or Vomiting            VEEG in the last 24 hours:    Background - continuous, symmetrical, well organized, reaching frequencies in the range of 8-9 Hz, reactive    Focal and generalized slowin. no generalized slowing,   2. borderline left FT focal slowing, mainly during drowsiness    Interictal activity - small to moderate number of left FT sharp waves that are epileptiform in nature    Events - none    Seizures - none    Impression: Abnormal VEEG as above    Plan -   Will continue monitoring to capture an event  Seizure precautions  No seizure medications for now  Discussed with patient, all questions answered.     Epilepsy Attending Note:     JAZMYNE EVERETT    58y Female  MRN [id number]    Vital Signs Last 24 Hrs  T(C): 36.2 (2023 05:25), Max: 36.2 (2023 05:25)  T(F): 97.2 (2023 05:25), Max: 97.2 (2023 05:25)  HR: 65 (2023 05:25) (65 - 73)  BP: 109/52 (2023 05:25) (109/52 - 172/77)  BP(mean): --  RR: 18 (2023 05:25) (18 - 20)  SpO2: --                              13.3   4.60  )-----------( 209      ( 2023 08:56 )             41.3       07-11    142  |  106  |  9<L>  ----------------------------<  99  4.5   |  26  |  0.6<L>    Ca    9.7      2023 08:56  Mg     2.1     07-11        MEDICATIONS  (STANDING):  budesonide 160 MICROgram(s)/formoterol 4.5 MICROgram(s) Inhaler 2 Puff(s) Inhalation two times a day  clonazePAM  Tablet 0.5 milliGRAM(s) Oral two times a day  cyanocobalamin 1000 MICROGram(s) Oral <User Schedule>  pantoprazole    Tablet 40 milliGRAM(s) Oral before breakfast    MEDICATIONS  (PRN):  acetaminophen     Tablet .. 650 milliGRAM(s) Oral every 6 hours PRN Temp greater or equal to 38C (100.4F), Mild Pain (1 - 3)  albuterol    90 MICROgram(s) HFA Inhaler 2 Puff(s) Inhalation every 6 hours PRN Shortness of Breath and/or Wheezing  LORazepam   Injectable 2 milliGRAM(s) IV Push every 6 hours PRN generalized tonic-clonic seizure lasting longer than 2 minutes, or two consecutive seizures without return to baseline in-between  ondansetron Injectable 4 milliGRAM(s) IV Push every 8 hours PRN Nausea and/or Vomiting            VEEG in the last 24 hours:    Background - continuous, symmetrical, well organized, reaching frequencies in the range of 8-9 Hz, reactive    Focal and generalized slowin. no generalized slowing,   2. borderline left FT focal slowing, mainly during drowsiness    Interictal activity - small to moderate number of left FT sharp waves that are epileptiform in nature    Events - none    Seizures - none    Impression: Abnormal VEEG as above    Plan -   Will continue monitoring to capture an event  Seizure precautions  No seizure medications for now  Send paraneoplastic panel  Discussed with patient, all questions answered.

## 2023-07-13 ENCOUNTER — TRANSCRIPTION ENCOUNTER (OUTPATIENT)
Age: 58
End: 2023-07-13

## 2023-07-13 VITALS
SYSTOLIC BLOOD PRESSURE: 109 MMHG | RESPIRATION RATE: 18 BRPM | TEMPERATURE: 98 F | HEART RATE: 74 BPM | DIASTOLIC BLOOD PRESSURE: 68 MMHG

## 2023-07-13 PROBLEM — F41.9 ANXIETY DISORDER, UNSPECIFIED: Chronic | Status: ACTIVE | Noted: 2023-07-10

## 2023-07-13 PROBLEM — J45.40 MODERATE PERSISTENT ASTHMA, UNCOMPLICATED: Chronic | Status: ACTIVE | Noted: 2023-07-10

## 2023-07-13 PROBLEM — K22.70 BARRETT'S ESOPHAGUS WITHOUT DYSPLASIA: Chronic | Status: ACTIVE | Noted: 2023-07-10

## 2023-07-13 PROCEDURE — 99231 SBSQ HOSP IP/OBS SF/LOW 25: CPT

## 2023-07-13 PROCEDURE — 76830 TRANSVAGINAL US NON-OB: CPT | Mod: 26

## 2023-07-13 PROCEDURE — 95720 EEG PHY/QHP EA INCR W/VEEG: CPT

## 2023-07-13 PROCEDURE — 71260 CT THORAX DX C+: CPT | Mod: 26

## 2023-07-13 PROCEDURE — 99239 HOSP IP/OBS DSCHRG MGMT >30: CPT

## 2023-07-13 PROCEDURE — 74177 CT ABD & PELVIS W/CONTRAST: CPT | Mod: 26

## 2023-07-13 RX ORDER — OXCARBAZEPINE 300 MG/1
TABLET, FILM COATED ORAL
Qty: 120 | Refills: 3
Start: 2023-07-13

## 2023-07-13 RX ORDER — CLONAZEPAM 1 MG
1 TABLET ORAL
Refills: 0 | Status: DISCONTINUED | OUTPATIENT
Start: 2023-07-13 | End: 2023-07-13

## 2023-07-13 RX ORDER — CLONAZEPAM 1 MG
0 TABLET ORAL
Qty: 0 | Refills: 0 | DISCHARGE

## 2023-07-13 RX ORDER — ALBUTEROL 90 UG/1
0 AEROSOL, METERED ORAL
Qty: 0 | Refills: 0 | DISCHARGE

## 2023-07-13 RX ORDER — OXCARBAZEPINE 300 MG/1
0 TABLET, FILM COATED ORAL
Qty: 120 | Refills: 3
Start: 2023-07-13

## 2023-07-13 RX ORDER — OXCARBAZEPINE 300 MG/1
150 TABLET, FILM COATED ORAL
Refills: 0 | Status: DISCONTINUED | OUTPATIENT
Start: 2023-07-13 | End: 2023-07-13

## 2023-07-13 RX ORDER — OXCARBAZEPINE 300 MG/1
1 TABLET, FILM COATED ORAL
Qty: 0 | Refills: 0 | DISCHARGE
Start: 2023-07-13

## 2023-07-13 RX ADMIN — OXCARBAZEPINE 150 MILLIGRAM(S): 300 TABLET, FILM COATED ORAL at 12:00

## 2023-07-13 RX ADMIN — PREGABALIN 1000 MICROGRAM(S): 225 CAPSULE ORAL at 09:02

## 2023-07-13 RX ADMIN — Medication 1 MILLIGRAM(S): at 18:10

## 2023-07-13 RX ADMIN — OXCARBAZEPINE 150 MILLIGRAM(S): 300 TABLET, FILM COATED ORAL at 18:09

## 2023-07-13 RX ADMIN — Medication 0.5 MILLIGRAM(S): at 05:04

## 2023-07-13 RX ADMIN — PANTOPRAZOLE SODIUM 40 MILLIGRAM(S): 20 TABLET, DELAYED RELEASE ORAL at 05:04

## 2023-07-13 NOTE — DISCHARGE NOTE NURSING/CASE MANAGEMENT/SOCIAL WORK - PATIENT PORTAL LINK FT
You can access the FollowMyHealth Patient Portal offered by [facility] by registering at the following website: [email or web address]. By joining [facility]’s FollowMyHealth portal, you will also be able to view your health information using other applications (apps) compatible with our system.

## 2023-07-13 NOTE — PROGRESS NOTE ADULT - SUBJECTIVE AND OBJECTIVE BOX
Epilepsy Attending Note:     JAZMYNE EVERETT    58y Female  MRN [id number]    Vital Signs Last 24 Hrs  T(C): 36.2 (2023 05:08), Max: 36.6 (2023 14:43)  T(F): 97.2 (2023 05:08), Max: 97.9 (2023 14:43)  HR: 65 (2023 05:08) (65 - 82)  BP: 117/64 (2023 05:08) (110/62 - 124/62)  BP(mean): --  RR: 18 (2023 05:08) (18 - 18)  SpO2: --                    MEDICATIONS  (STANDING):  budesonide 160 MICROgram(s)/formoterol 4.5 MICROgram(s) Inhaler 2 Puff(s) Inhalation two times a day  clonazePAM  Tablet 1 milliGRAM(s) Oral two times a day  cyanocobalamin 1000 MICROGram(s) Oral <User Schedule>  pantoprazole    Tablet 40 milliGRAM(s) Oral before breakfast    MEDICATIONS  (PRN):  acetaminophen     Tablet .. 650 milliGRAM(s) Oral every 6 hours PRN Temp greater or equal to 38C (100.4F), Mild Pain (1 - 3)  albuterol    90 MICROgram(s) HFA Inhaler 2 Puff(s) Inhalation every 6 hours PRN Shortness of Breath and/or Wheezing  LORazepam   Injectable 2 milliGRAM(s) IV Push every 6 hours PRN generalized tonic-clonic seizure lasting longer than 2 minutes, or two consecutive seizures without return to baseline in-between  ondansetron Injectable 4 milliGRAM(s) IV Push every 8 hours PRN Nausea and/or Vomiting            VEEG in the last 24 hours:    Background - continuous, symmetrical, well organized, reaching frequencies in the range of 8-9 Hz, reactive    Focal and generalized slowin. no generalized slowing,   2. borderline left FT focal slowing, mainly during drowsiness    Interictal activity - rare to small number of left FT sharp waves that are epileptiform in nature    Events - none    Seizures - none    Impression: Abnormal VEEG as above. Paraneoplastic serum panel sent yesterday.    Plan -   Will discontinue monitoring   Medication options and side effect profiles reviewed with patient  Start Trileptal 150mg q12hrs, to be gradually titrated up  Follow up as schedule with level  MRI brain with contrast prior to follow up

## 2023-07-13 NOTE — DISCHARGE NOTE NURSING/CASE MANAGEMENT/SOCIAL WORK - NSDCPEFALRISK_GEN_ALL_CORE
For information on Fall & Injury Prevention, visit: [email or web address] OR  [email or web address] OR  https://www.cdc.gov/steadi/patient.html

## 2023-07-13 NOTE — DISCHARGE NOTE PROVIDER - NSDCFUSCHEDAPPT_GEN_ALL_CORE_FT
Almas Daly  [facility] Physician [facility]  NEUROLOGY [address]  Scheduled Appointment: 08/17/2023    Morro Quiroz  [facility] Physician [facility]  OBGYNGEN [address]  Scheduled Appointment: 09/20/2023     Unknown, Doctor  [facility] PreAdmits  Scheduled Appointment: 07/20/2023    [facility] Physician [facility]  MRI SI [address]  Scheduled Appointment: 07/20/2023    Almas Daly  [facility]  NEUROLOGY [address]  Scheduled Appointment: 08/17/2023    Morro Quiroz  [facility] Physician [facility]  OBGYNGEN [address]  Scheduled Appointment: 09/20/2023

## 2023-07-13 NOTE — DISCHARGE NOTE PROVIDER - NSDCMRMEDTOKEN_GEN_ALL_CORE_FT
ALBUTEROL HFA INH (200 PUFFS) 6.7GM: INHALE 2 PUFFS BY MOUTH EVERY 4 HOURS  CLONAZEPAM 1MG TABLETS: TAKE 1 TABLET BY MOUTH TWICE DAILY  OMEPRAZOLE  40 MG CPDR:   Oxcarbazepine 150mg tablets: take 1 tab every 12 hrs x 1 week, then 1 tab in AM &amp; 2 tabs at night x 1 week, then increase to 2 tabs every 12 hrs  WIXELA INHUB  100-50 MCG/ACT AEPB:    ALBUTEROL HFA INH (200 PUFFS) 6.7GM: INHALE 2 PUFFS BY MOUTH EVERY 4 HOURS  CLONAZEPAM 1MG TABLETS: TAKE 1 TABLET BY MOUTH TWICE DAILY  OMEPRAZOLE  40 MG CPDR:   OXcarbazepine 150 mg oral tablet: 1 tab(s) orally 2 times a day  Oxcarbazepine 150mg tablets: take 1 tab every 12 hrs x 1 week, then 1 tab in AM &amp; 2 tabs at night x 1 week, then increase to 2 tabs every 12 hrs  WIXELA INHUB  100-50 MCG/ACT AEPB:

## 2023-07-13 NOTE — DISCHARGE NOTE PROVIDER - HOSPITAL COURSE
59 y/o F PMHx  anxiety, Tipton's esophagus, moderate persistent asthma admitted for VEEG. Patient reports intermittent memory loss without LOC, or tongue biting, or incontinence.  Pt denies fever, chills, chest pain, SOB, diaphoresis, n/v .      # Memory loss:   - TSH, B12, Folic acid WNL.   - Video EEG:  rare to small number of left FT sharp waves that are epileptiform in nature  - pt will start Trileptal 150mg q12hrs, to be gradually titrated up  - Plan for MRI as an outpt.     Weight los  - involuntary 65 lbs since January 2023  - Pt follow up with her GI who performed very thorough work up without any finding as per the patient.   - Pan CT : negative.   - PT is up to date with PAP, mammogram and follows with her GYN.       59 y/o F PMHx  anxiety, Tipton's esophagus, moderate persistent asthma admitted for VEEG. Patient reports intermittent memory loss without LOC, or tongue biting, or incontinence.  Pt denies fever, chills, chest pain, SOB, diaphoresis, n/v .      # Memory loss:   - TSH, B12, Folic acid WNL.   - Video EEG:  rare to small number of left FT sharp waves that are epileptiform in nature  - pt will start Trileptal 150mg q12hrs, to be gradually titrated up  - Plan for MRI as an outpt.     Weight los  - involuntary 65 lbs since January 2023  - Pt follow up with her GI who performed very thorough work up without any finding as per the patient.   - [place] CT : negative except for  left-sided pelvic nodularity measuring 1.8 cm at the vaginal   cuff site. While this could represent asymmetry of the vaginal cuff or   postsurgical change. will obtain transvaginal US and the patient will follow up with her PCP regarding the result and to continue the work up.    - PT is up to date with PAP, mammogram and follows with her GYN.

## 2023-07-13 NOTE — PROGRESS NOTE ADULT - SUBJECTIVE AND OBJECTIVE BOX
Epilepsy Team Discharge Note:    VEEG monitoring completed, patient is cleared for discharge from neurology standpoint.    Follow up neurology appointment is scheduled with Dr. Daly   for August 17, 2023 at 4:30 pm.    [address] [place] [address], [address]  [phone]    Discharge seizure medications are:  Trileptal 150mg q12hrs x 1week, then 150mg in AM & 300mg HS x 1 week, then 300mg q12hrs (started today)    Rx sent to the pharmacy.    Patient was also given Rx for CBC, CMP, Trileptal level trough to be done prior to follow up.    MRI brain with/without contrast with seizure protocol prior to follow up. Authorization obtained, Rx given to the patients.    Detailed written and verbal instructions regarding seizure precautions and follow up plan are given to the patient, all questions answered. Patient verbalized understanding.    Discussed with medical and nursing teams.   Epilepsy Team Discharge Note:    VEEG monitoring completed, patient is cleared for discharge from neurology standpoint.    Follow up neurology appointment is scheduled with Dr. Daly   for August 17, 2023 at 4:30 pm.    [address] [place] [address], [address]  [phone]    Discharge seizure medications are:  Trileptal 150mg q12hrs x 1 week, then 150mg in AM & 300mg HS x 1 week, then 300mg q12hrs (started today)    Rx sent to the pharmacy.    Patient was also given Rx for CBC, CMP, Trileptal level trough to be done prior to follow up.    MRI brain with/without contrast with seizure protocol prior to follow up. Authorization obtained, Rx given to the patients.    Detailed written and verbal instructions regarding seizure precautions and follow up plan are given to the patient, all questions answered. Patient verbalized understanding.    Discussed with medical and nursing teams.   Epilepsy Team Discharge Note:    VEEG monitoring completed, patient is cleared for discharge from neurology standpoint.    Follow up neurology appointment is scheduled with Dr. Daly   for August 17, 2023 at 4:30 pm.    [address] [place] [address], [address]  [phone]    Discharge seizure medications are:  Trileptal 150mg q12hrs x 1 week, then 150mg in AM & 300mg HS x 1 week, then 300mg q12hrs (started today)    Rx sent to the pharmacy.    Paraneoplastic blood panel, sent yesterday.    Patient was also given Rx for CBC, CMP, Trileptal level trough to be done prior to follow up.    MRI brain with/without contrast with seizure protocol prior to follow up. Authorization obtained, Rx given to the patients.    Detailed written and verbal instructions regarding seizure precautions and follow up plan are given to the patient, all questions answered. Patient verbalized understanding.    Discussed with medical and nursing teams.   Epilepsy Team Discharge Note:    VEEG monitoring completed, patient is cleared for discharge from neurology standpoint.    Follow up neurology appointment is scheduled with Dr. Daly   for August 17, 2023 at 4:30 pm.    [address] [place] [address], [address]  [phone]    Discharge seizure medications are:  Trileptal 150mg q12hrs x 1 week, then 150mg in AM & 300mg HS x 1 week, then 300mg q12hrs (started today)    Rx sent to the pharmacy.    Paraneoplastic blood panel, sent yesterday.    Patient was also given Rx for CBC, CMP, Trileptal level trough to be done prior to follow up.    MRI brain with/without contrast with seizure protocol prior to follow up. Authorization obtained, Rx given to the patients.    Limit daily fluid intake to 6-8 cups, about half of it should be electrolyte-containing fluids.    Detailed written and verbal instructions regarding seizure precautions and follow up plan are given to the patient, all questions answered. Patient verbalized understanding.    Discussed with medical and nursing teams.

## 2023-07-18 LAB
AMPHIPHYSIN AB TITR SER: NEGATIVE — SIGNIFICANT CHANGE UP
CRMP-5-IGG WESTERN BLOT: NEGATIVE — SIGNIFICANT CHANGE UP
GLIAL NUC TYPE 1 AB TITR SER: NEGATIVE — SIGNIFICANT CHANGE UP
HU1 AB TITR SER: NEGATIVE — SIGNIFICANT CHANGE UP
HU2 AB TITR SER IF: NEGATIVE — SIGNIFICANT CHANGE UP
HU3 AB TITR SER: NEGATIVE — SIGNIFICANT CHANGE UP
IFA NOTES: SIGNIFICANT CHANGE UP
P/Q TYPE ANTIBODY: 0 NMOL/L — SIGNIFICANT CHANGE UP
PARANEOPLASTIC AB SER-IMP: SIGNIFICANT CHANGE UP
PCA-1 AB TITR SER: NEGATIVE — SIGNIFICANT CHANGE UP
PCA-2 AB TITR SER: NEGATIVE — SIGNIFICANT CHANGE UP
PCA-TR AB TITR SER: NEGATIVE — SIGNIFICANT CHANGE UP
VGKC AB SER-SCNC: 0 NMOL/L — SIGNIFICANT CHANGE UP

## 2023-07-20 ENCOUNTER — RESULT REVIEW (OUTPATIENT)
Age: 58
End: 2023-07-20

## 2023-07-20 ENCOUNTER — OUTPATIENT (OUTPATIENT)
Dept: OUTPATIENT SERVICES | Facility: HOSPITAL | Age: 58
LOS: 1 days | End: 2023-07-20
Payer: COMMERCIAL

## 2023-07-20 DIAGNOSIS — R56.9 UNSPECIFIED CONVULSIONS: ICD-10-CM

## 2023-07-20 DIAGNOSIS — Z00.00 ENCOUNTER FOR GENERAL ADULT MEDICAL EXAMINATION WITHOUT ABNORMAL FINDINGS: ICD-10-CM

## 2023-07-20 DIAGNOSIS — Z98.890 OTHER SPECIFIED POSTPROCEDURAL STATES: Chronic | ICD-10-CM

## 2023-07-20 PROCEDURE — 70553 MRI BRAIN STEM W/O & W/DYE: CPT

## 2023-07-20 PROCEDURE — 70553 MRI BRAIN STEM W/O & W/DYE: CPT | Mod: 26

## 2023-07-20 PROCEDURE — A9579: CPT

## 2023-07-21 DIAGNOSIS — R56.9 UNSPECIFIED CONVULSIONS: ICD-10-CM

## 2023-08-17 ENCOUNTER — APPOINTMENT (OUTPATIENT)
Dept: NEUROLOGY | Facility: CLINIC | Age: 58
End: 2023-08-17
Payer: COMMERCIAL

## 2023-08-17 PROCEDURE — 99214 OFFICE O/P EST MOD 30 MIN: CPT

## 2023-08-18 NOTE — ASSESSMENT
[FreeTextEntry1] : 1- abnormal EEG 2- R/O mild mood D/O 3- memory complaint 4- R/O cervical HA  plan check the paraneoplastic panel discussed the posture and ergonomy depending on the results might do LP

## 2023-08-18 NOTE — PHYSICAL EXAM
[FreeTextEntry1] : A/A/Ox3 good attention good mood good executive behavior CN- normal slight ache with head turn to the right stable gait no dysmetria negative Romberg

## 2023-08-18 NOTE — HISTORY OF PRESENT ILLNESS
[FreeTextEntry1] : Analia is here for the F/U She was admitted to the EMU and discharged on 7/13 The EEG showed large number of left FT sharp waves. No events were captured  She was discharged on Trileptal that she is taking now 300 bid Her MRI of the brain was done after DC and was normal The blood test including the CMP is normal The Trileptal level is not back yet in the hospital the blood for paraneoplastic panel was sent and is still pending She says she is not seeing that much of a change still gets some HA. The Ha is mainly as an ache not associated with N/V and or photophobia . It is often on the both sides and above the ear She is quir=te functional, despite her complaint of memory , objectively she appears very good and also her level of function refects it. However it appear tthat she is slightlt overwhelmed and perhaps blue and ? being lonely

## 2023-09-16 ENCOUNTER — RESULT REVIEW (OUTPATIENT)
Age: 58
End: 2023-09-16

## 2023-09-16 ENCOUNTER — OUTPATIENT (OUTPATIENT)
Dept: OUTPATIENT SERVICES | Facility: HOSPITAL | Age: 58
LOS: 1 days | End: 2023-09-16
Payer: COMMERCIAL

## 2023-09-16 DIAGNOSIS — Z98.890 OTHER SPECIFIED POSTPROCEDURAL STATES: Chronic | ICD-10-CM

## 2023-09-16 DIAGNOSIS — Z00.8 ENCOUNTER FOR OTHER GENERAL EXAMINATION: ICD-10-CM

## 2023-09-16 DIAGNOSIS — Z12.31 ENCOUNTER FOR SCREENING MAMMOGRAM FOR MALIGNANT NEOPLASM OF BREAST: ICD-10-CM

## 2023-09-16 PROCEDURE — 77063 BREAST TOMOSYNTHESIS BI: CPT

## 2023-09-16 PROCEDURE — 77067 SCR MAMMO BI INCL CAD: CPT

## 2023-09-16 PROCEDURE — 77063 BREAST TOMOSYNTHESIS BI: CPT | Mod: 26

## 2023-09-16 PROCEDURE — 77067 SCR MAMMO BI INCL CAD: CPT | Mod: 26

## 2023-09-17 DIAGNOSIS — Z12.31 ENCOUNTER FOR SCREENING MAMMOGRAM FOR MALIGNANT NEOPLASM OF BREAST: ICD-10-CM

## 2023-09-20 ENCOUNTER — APPOINTMENT (OUTPATIENT)
Dept: OBGYN | Facility: CLINIC | Age: 58
End: 2023-09-20
Payer: COMMERCIAL

## 2023-09-20 VITALS
DIASTOLIC BLOOD PRESSURE: 82 MMHG | HEIGHT: 62 IN | WEIGHT: 120 LBS | SYSTOLIC BLOOD PRESSURE: 135 MMHG | BODY MASS INDEX: 22.08 KG/M2 | HEART RATE: 69 BPM

## 2023-09-20 VITALS
HEIGHT: 62 IN | BODY MASS INDEX: 22.08 KG/M2 | SYSTOLIC BLOOD PRESSURE: 113 MMHG | WEIGHT: 120 LBS | DIASTOLIC BLOOD PRESSURE: 72 MMHG | HEART RATE: 75 BPM

## 2023-09-20 DIAGNOSIS — Z01.419 ENCOUNTER FOR GYNECOLOGICAL EXAMINATION (GENERAL) (ROUTINE) W/OUT ABNORMAL FINDINGS: ICD-10-CM

## 2023-09-20 LAB
BILIRUB UR QL STRIP: NORMAL
CLARITY UR: CLEAR
COLLECTION METHOD: NORMAL
GLUCOSE UR-MCNC: NORMAL
HCG UR QL: 0.2 EU/DL
HGB UR QL STRIP.AUTO: NORMAL
KETONES UR-MCNC: NORMAL
LEUKOCYTE ESTERASE UR QL STRIP: NORMAL
NITRITE UR QL STRIP: NORMAL
PH UR STRIP: 7.5
PROT UR STRIP-MCNC: NORMAL
SP GR UR STRIP: 1.01

## 2023-09-20 PROCEDURE — 81003 URINALYSIS AUTO W/O SCOPE: CPT | Mod: QW

## 2023-09-20 PROCEDURE — 99396 PREV VISIT EST AGE 40-64: CPT

## 2023-09-20 RX ORDER — CLONAZEPAM 2 MG/1
TABLET ORAL
Refills: 0 | Status: ACTIVE | COMMUNITY

## 2023-11-24 ENCOUNTER — OUTPATIENT (OUTPATIENT)
Dept: OUTPATIENT SERVICES | Facility: HOSPITAL | Age: 58
LOS: 1 days | End: 2023-11-24
Payer: COMMERCIAL

## 2023-11-24 DIAGNOSIS — Z13.820 ENCOUNTER FOR SCREENING FOR OSTEOPOROSIS: ICD-10-CM

## 2023-11-24 DIAGNOSIS — Z00.8 ENCOUNTER FOR OTHER GENERAL EXAMINATION: ICD-10-CM

## 2023-11-24 DIAGNOSIS — Z98.890 OTHER SPECIFIED POSTPROCEDURAL STATES: Chronic | ICD-10-CM

## 2023-11-24 PROCEDURE — 77080 DXA BONE DENSITY AXIAL: CPT

## 2023-11-25 DIAGNOSIS — Z13.820 ENCOUNTER FOR SCREENING FOR OSTEOPOROSIS: ICD-10-CM

## 2023-12-27 ENCOUNTER — APPOINTMENT (OUTPATIENT)
Dept: NEUROLOGY | Facility: CLINIC | Age: 58
End: 2023-12-27
Payer: COMMERCIAL

## 2023-12-27 VITALS
HEART RATE: 76 BPM | WEIGHT: 126.13 LBS | DIASTOLIC BLOOD PRESSURE: 74 MMHG | TEMPERATURE: 97.7 F | OXYGEN SATURATION: 98 % | SYSTOLIC BLOOD PRESSURE: 121 MMHG | BODY MASS INDEX: 23.21 KG/M2 | HEIGHT: 62 IN

## 2023-12-27 PROCEDURE — 99213 OFFICE O/P EST LOW 20 MIN: CPT

## 2023-12-27 NOTE — HISTORY OF PRESENT ILLNESS
[FreeTextEntry1] : Analia is here for the F/U She looks very happy . She says she has been feeling well and did not have any other events  that in the past was described by others as staring or spacing out. She is tolerating the medication well. and describes her mood and memory as good. She sleeps well however recently in her sleep she would get neck and a radiating pain to the top of the left shoulder that would wake her up She was also seen by an MD and did do an EMG showing Rt, side CTS. She lost and regained few pounds She does walk about 3 miles every day she does have good energy Now she works about 8 hours/ week NO LBP No vertigo Paraneoplastic panel was negative

## 2023-12-27 NOTE — PHYSICAL EXAM
[FreeTextEntry1] : A/A/Ox3 Follows 4 step commands good mood good attention normal language CN- normal slight decrease ROM of the neck with feeling tense on the left side upon rotaion no atrophy normal  No dysmetria slight left sided hip tilt  negative Romberg

## 2023-12-27 NOTE — ASSESSMENT
[FreeTextEntry1] : 1- left hemispheric epilepsy 2- R/O C4-5 rad 3- R/O mild mood D/O ( doing well )   plan  continue current discussed the posture and daily exercise Levels EEG F/U

## 2024-01-10 ENCOUNTER — APPOINTMENT (OUTPATIENT)
Dept: OBGYN | Facility: CLINIC | Age: 59
End: 2024-01-10
Payer: COMMERCIAL

## 2024-01-10 VITALS
DIASTOLIC BLOOD PRESSURE: 70 MMHG | HEART RATE: 72 BPM | WEIGHT: 126 LBS | SYSTOLIC BLOOD PRESSURE: 120 MMHG | BODY MASS INDEX: 23.19 KG/M2 | HEIGHT: 62 IN

## 2024-01-10 DIAGNOSIS — N93.0 POSTCOITAL AND CONTACT BLEEDING: ICD-10-CM

## 2024-01-10 PROCEDURE — 99213 OFFICE O/P EST LOW 20 MIN: CPT

## 2024-01-10 RX ORDER — OXCARBAZEPINE 150 MG/1
150 TABLET, FILM COATED ORAL TWICE DAILY
Qty: 120 | Refills: 6 | Status: ACTIVE | COMMUNITY

## 2024-01-10 NOTE — HISTORY OF PRESENT ILLNESS
[FreeTextEntry1] : patient is here for bleeding, she had intercourse on 1/6/2024. Bleeding lasted 2 days. Analia is also experiencing left lower pelvic pain

## 2024-05-15 NOTE — PROGRESS NOTE ADULT - ASSESSMENT
57 y/o F PMHx  anxiety, Tipton's esophagus, moderate persistent asthma admitted for VEEG. Patient reports intermittent memory loss without LOC, or tongue biting, or incontinence.  Pt denies fever, chills, chest pain, SOB, diaphoresis, n/v .      # Memory loss:   - TSH, B12, Folic acid WNL.   - Video EEG   - seizure precautions  -- Ativan 2mg IV PRN for generalized tonic-clonic seizure lasting longer than 2 minutes, or two consecutive seizures without return to baseline in-between   - continue AED at home dose, obtain levels  - Mg level, keep > 2.0; f/u 1pm labs  - Plan for MRI as an outpt.     Weight loss   - involuntary 65 lbs since January 2023  - uptodate with negative PAP, mammogram, and colonscopy, hx/o polyps   - Will obtain PAN CT.     Tipton's esophagus/GERD  - c/w omperazole     Moderate persistent Asthma  - c/w albuterol PRN  - Wixela non-formulary> symbicort   - stable     Anxiety  - clonazepam BID.    - consider SSRI      DVT/GI px   - Lovenox/ on a diet.     Full code     
Encourage healthy diet and exercise  Encourage monthly self breast exams  Check labs   Mammogram ordered  DEXA scan ordered   
MEDICATIONS  (STANDING):  budesonide 160 MICROgram(s)/formoterol 4.5 MICROgram(s) Inhaler 2 Puff(s) Inhalation two times a day  clonazePAM  Tablet 0.5 milliGRAM(s) Oral two times a day  cyanocobalamin 1000 MICROGram(s) Oral <User Schedule>  pantoprazole    Tablet 40 milliGRAM(s) Oral before breakfast    MEDICATIONS  (PRN):  acetaminophen     Tablet .. 650 milliGRAM(s) Oral every 6 hours PRN Temp greater or equal to 38C (100.4F), Mild Pain (1 - 3)  albuterol    90 MICROgram(s) HFA Inhaler 2 Puff(s) Inhalation every 6 hours PRN Shortness of Breath and/or Wheezing  LORazepam   Injectable 2 milliGRAM(s) IV Push every 6 hours PRN generalized tonic-clonic seizure lasting longer than 2 minutes, or two consecutive seizures without return to baseline in-between  ondansetron Injectable 4 milliGRAM(s) IV Push every 8 hours PRN Nausea and/or Vomiting    Blank stare epsiodes, Intermittent memory loss rule out seizures or possibly polypharmacy.   - Video EEG   - seizure precautions  -- Ativan 2mg IV PRN for generalized tonic-clonic seizure lasting longer than 2 minutes, or two consecutive seizures without return to baseline in-between   - continue AED at home dose, obtain levels  - Mg level, keep > 2.0; f/u 1pm labs  - neurology consulted     Weight loss   - check TSH and T4  - involuntary 65 lbs since January 2023  - uptodate with negative PAP, mammogram, and colonscopy, hx/o polyps     Tipton's esophagus/GERD  - c/w omperazole     Moderate persistent Asthma  - c/w albuterol PRN  - Wixela non-formulary> symbicort   - stable     Anxiety  - clonazepam BID.    - consider SSRI    B12 deficiency   - cont b12     DVT/GI px   - Lovenox/ on a diet.     Full code

## 2024-06-24 ENCOUNTER — APPOINTMENT (OUTPATIENT)
Dept: NEUROLOGY | Facility: CLINIC | Age: 59
End: 2024-06-24
Payer: COMMERCIAL

## 2024-06-24 VITALS
WEIGHT: 128.13 LBS | SYSTOLIC BLOOD PRESSURE: 101 MMHG | OXYGEN SATURATION: 99 % | HEIGHT: 62 IN | BODY MASS INDEX: 23.58 KG/M2 | RESPIRATION RATE: 20 BRPM | HEART RATE: 80 BPM | TEMPERATURE: 97.1 F | DIASTOLIC BLOOD PRESSURE: 66 MMHG

## 2024-06-24 DIAGNOSIS — R56.9 UNSPECIFIED CONVULSIONS: ICD-10-CM

## 2024-06-24 PROCEDURE — 99214 OFFICE O/P EST MOD 30 MIN: CPT

## 2024-06-24 PROCEDURE — 95816 EEG AWAKE AND DROWSY: CPT

## 2024-09-04 ENCOUNTER — RX RENEWAL (OUTPATIENT)
Age: 59
End: 2024-09-04

## 2024-10-04 ENCOUNTER — RX RENEWAL (OUTPATIENT)
Age: 59
End: 2024-10-04

## 2024-12-16 ENCOUNTER — RX RENEWAL (OUTPATIENT)
Age: 59
End: 2024-12-16

## 2024-12-31 DIAGNOSIS — Z12.31 ENCOUNTER FOR SCREENING MAMMOGRAM FOR MALIGNANT NEOPLASM OF BREAST: ICD-10-CM

## 2025-01-03 ENCOUNTER — RESULT REVIEW (OUTPATIENT)
Age: 60
End: 2025-01-03

## 2025-01-03 ENCOUNTER — OUTPATIENT (OUTPATIENT)
Dept: OUTPATIENT SERVICES | Facility: HOSPITAL | Age: 60
LOS: 1 days | End: 2025-01-03
Payer: COMMERCIAL

## 2025-01-03 DIAGNOSIS — Z98.890 OTHER SPECIFIED POSTPROCEDURAL STATES: Chronic | ICD-10-CM

## 2025-01-03 DIAGNOSIS — Z12.31 ENCOUNTER FOR SCREENING MAMMOGRAM FOR MALIGNANT NEOPLASM OF BREAST: ICD-10-CM

## 2025-01-03 PROCEDURE — 77063 BREAST TOMOSYNTHESIS BI: CPT

## 2025-01-03 PROCEDURE — 77063 BREAST TOMOSYNTHESIS BI: CPT | Mod: 26

## 2025-01-03 PROCEDURE — 77067 SCR MAMMO BI INCL CAD: CPT

## 2025-01-03 PROCEDURE — 77067 SCR MAMMO BI INCL CAD: CPT | Mod: 26

## 2025-01-04 DIAGNOSIS — Z12.31 ENCOUNTER FOR SCREENING MAMMOGRAM FOR MALIGNANT NEOPLASM OF BREAST: ICD-10-CM

## 2025-01-30 ENCOUNTER — APPOINTMENT (OUTPATIENT)
Dept: OBGYN | Facility: CLINIC | Age: 60
End: 2025-01-30
Payer: COMMERCIAL

## 2025-01-30 ENCOUNTER — LABORATORY RESULT (OUTPATIENT)
Age: 60
End: 2025-01-30

## 2025-01-30 VITALS
BODY MASS INDEX: 23.92 KG/M2 | SYSTOLIC BLOOD PRESSURE: 128 MMHG | DIASTOLIC BLOOD PRESSURE: 81 MMHG | HEIGHT: 62 IN | WEIGHT: 130 LBS | HEART RATE: 73 BPM

## 2025-01-30 DIAGNOSIS — Z01.419 ENCOUNTER FOR GYNECOLOGICAL EXAMINATION (GENERAL) (ROUTINE) W/OUT ABNORMAL FINDINGS: ICD-10-CM

## 2025-01-30 LAB
BILIRUB UR QL STRIP: NORMAL
CLARITY UR: CLEAR
COLLECTION METHOD: NORMAL
GLUCOSE UR-MCNC: NORMAL
HCG UR QL: 0.2 EU/DL
HGB UR QL STRIP.AUTO: NORMAL
KETONES UR-MCNC: NORMAL
LEUKOCYTE ESTERASE UR QL STRIP: NORMAL
NITRITE UR QL STRIP: NORMAL
PH UR STRIP: 6
PROT UR STRIP-MCNC: NORMAL
SP GR UR STRIP: 1.02

## 2025-01-30 PROCEDURE — 81003 URINALYSIS AUTO W/O SCOPE: CPT | Mod: QW

## 2025-01-30 PROCEDURE — 99396 PREV VISIT EST AGE 40-64: CPT | Mod: 25

## 2025-02-03 LAB — HPV HIGH+LOW RISK DNA PNL CVX: DETECTED

## 2025-02-06 ENCOUNTER — NON-APPOINTMENT (OUTPATIENT)
Age: 60
End: 2025-02-06

## 2025-02-06 ENCOUNTER — APPOINTMENT (OUTPATIENT)
Dept: NEUROLOGY | Facility: CLINIC | Age: 60
End: 2025-02-06
Payer: COMMERCIAL

## 2025-02-06 VITALS
HEIGHT: 62 IN | WEIGHT: 130 LBS | TEMPERATURE: 97.8 F | SYSTOLIC BLOOD PRESSURE: 152 MMHG | DIASTOLIC BLOOD PRESSURE: 80 MMHG | HEART RATE: 81 BPM | BODY MASS INDEX: 23.92 KG/M2 | OXYGEN SATURATION: 100 %

## 2025-02-06 DIAGNOSIS — R56.9 UNSPECIFIED CONVULSIONS: ICD-10-CM

## 2025-02-06 PROCEDURE — 99213 OFFICE O/P EST LOW 20 MIN: CPT

## 2025-02-09 LAB — CYTOLOGY CVX/VAG DOC THIN PREP: ABNORMAL

## 2025-02-10 ENCOUNTER — NON-APPOINTMENT (OUTPATIENT)
Age: 60
End: 2025-02-10

## 2025-02-26 ENCOUNTER — APPOINTMENT (OUTPATIENT)
Dept: OBGYN | Facility: CLINIC | Age: 60
End: 2025-02-26
Payer: COMMERCIAL

## 2025-02-26 VITALS
BODY MASS INDEX: 23.92 KG/M2 | HEIGHT: 62 IN | SYSTOLIC BLOOD PRESSURE: 126 MMHG | DIASTOLIC BLOOD PRESSURE: 79 MMHG | WEIGHT: 130 LBS | HEART RATE: 73 BPM

## 2025-02-26 DIAGNOSIS — R87.612 LOW GRADE SQUAMOUS INTRAEPITHELIAL LESION ON CYTOLOGIC SMEAR OF CERVIX (LGSIL): ICD-10-CM

## 2025-02-26 PROCEDURE — 57456 ENDOCERV CURETTAGE W/SCOPE: CPT

## 2025-08-27 ENCOUNTER — APPOINTMENT (OUTPATIENT)
Dept: NEUROLOGY | Facility: CLINIC | Age: 60
End: 2025-08-27

## 2025-09-03 ENCOUNTER — APPOINTMENT (OUTPATIENT)
Dept: NEUROLOGY | Facility: CLINIC | Age: 60
End: 2025-09-03
Payer: COMMERCIAL

## 2025-09-03 ENCOUNTER — NON-APPOINTMENT (OUTPATIENT)
Age: 60
End: 2025-09-03

## 2025-09-03 VITALS
SYSTOLIC BLOOD PRESSURE: 123 MMHG | OXYGEN SATURATION: 100 % | HEIGHT: 62 IN | DIASTOLIC BLOOD PRESSURE: 74 MMHG | WEIGHT: 139 LBS | TEMPERATURE: 97.6 F | BODY MASS INDEX: 25.58 KG/M2 | HEART RATE: 67 BPM

## 2025-09-03 DIAGNOSIS — R56.9 UNSPECIFIED CONVULSIONS: ICD-10-CM

## 2025-09-03 PROCEDURE — 99213 OFFICE O/P EST LOW 20 MIN: CPT

## 2025-09-15 ENCOUNTER — APPOINTMENT (OUTPATIENT)
Dept: NEUROLOGY | Facility: CLINIC | Age: 60
End: 2025-09-15